# Patient Record
Sex: FEMALE | Race: ASIAN | NOT HISPANIC OR LATINO | URBAN - METROPOLITAN AREA
[De-identification: names, ages, dates, MRNs, and addresses within clinical notes are randomized per-mention and may not be internally consistent; named-entity substitution may affect disease eponyms.]

---

## 2019-02-23 ENCOUNTER — EMERGENCY (EMERGENCY)
Facility: HOSPITAL | Age: 34
LOS: 1 days | Discharge: HOME | End: 2019-02-23
Attending: EMERGENCY MEDICINE
Payer: COMMERCIAL

## 2019-02-23 VITALS
OXYGEN SATURATION: 100 % | TEMPERATURE: 98 F | RESPIRATION RATE: 16 BRPM | DIASTOLIC BLOOD PRESSURE: 75 MMHG | WEIGHT: 95.02 LBS | SYSTOLIC BLOOD PRESSURE: 112 MMHG | HEART RATE: 116 BPM | HEIGHT: 59 IN

## 2019-02-23 PROCEDURE — 99284 EMERGENCY DEPT VISIT MOD MDM: CPT | Mod: 25

## 2019-02-23 PROCEDURE — 93010 ELECTROCARDIOGRAM REPORT: CPT

## 2019-02-23 RX ORDER — DEXAMETHASONE 0.5 MG/5ML
10 ELIXIR ORAL ONCE
Qty: 0 | Refills: 0 | Status: COMPLETED | OUTPATIENT
Start: 2019-02-23 | End: 2019-02-23

## 2019-02-23 RX ORDER — SODIUM CHLORIDE 9 MG/ML
2000 INJECTION INTRAMUSCULAR; INTRAVENOUS; SUBCUTANEOUS ONCE
Qty: 0 | Refills: 0 | Status: COMPLETED | OUTPATIENT
Start: 2019-02-23 | End: 2019-02-23

## 2019-02-23 RX ORDER — ACETAMINOPHEN 500 MG
650 TABLET ORAL ONCE
Qty: 0 | Refills: 0 | Status: COMPLETED | OUTPATIENT
Start: 2019-02-23 | End: 2019-02-23

## 2019-02-23 RX ORDER — KETOROLAC TROMETHAMINE 30 MG/ML
15 SYRINGE (ML) INJECTION ONCE
Qty: 0 | Refills: 0 | Status: DISCONTINUED | OUTPATIENT
Start: 2019-02-23 | End: 2019-02-23

## 2019-02-23 RX ORDER — ONDANSETRON 8 MG/1
4 TABLET, FILM COATED ORAL ONCE
Qty: 0 | Refills: 0 | Status: COMPLETED | OUTPATIENT
Start: 2019-02-23 | End: 2019-02-23

## 2019-02-23 RX ADMIN — ONDANSETRON 4 MILLIGRAM(S): 8 TABLET, FILM COATED ORAL at 23:40

## 2019-02-23 RX ADMIN — Medication 10 MILLIGRAM(S): at 23:48

## 2019-02-23 RX ADMIN — Medication 15 MILLIGRAM(S): at 23:40

## 2019-02-23 RX ADMIN — Medication 650 MILLIGRAM(S): at 23:40

## 2019-02-23 RX ADMIN — SODIUM CHLORIDE 2000 MILLILITER(S): 9 INJECTION INTRAMUSCULAR; INTRAVENOUS; SUBCUTANEOUS at 23:40

## 2019-02-23 NOTE — ED ADULT TRIAGE NOTE - CHIEF COMPLAINT QUOTE
She has cough, chest congestion, fever, difficulty breathing, vomiting, headache x few days, not relieved by prescribed medications

## 2019-02-24 VITALS
SYSTOLIC BLOOD PRESSURE: 90 MMHG | TEMPERATURE: 99 F | DIASTOLIC BLOOD PRESSURE: 46 MMHG | HEART RATE: 91 BPM | RESPIRATION RATE: 16 BRPM | OXYGEN SATURATION: 99 %

## 2019-02-24 LAB
ALBUMIN SERPL ELPH-MCNC: 3.1 G/DL — LOW (ref 3.5–5)
ALP SERPL-CCNC: 65 U/L — SIGNIFICANT CHANGE UP (ref 40–120)
ALT FLD-CCNC: 15 U/L DA — SIGNIFICANT CHANGE UP (ref 10–60)
ANION GAP SERPL CALC-SCNC: 9 MMOL/L — SIGNIFICANT CHANGE UP (ref 5–17)
AST SERPL-CCNC: 18 U/L — SIGNIFICANT CHANGE UP (ref 10–40)
BASOPHILS # BLD AUTO: 0.02 K/UL — SIGNIFICANT CHANGE UP (ref 0–0.2)
BASOPHILS NFR BLD AUTO: 0.1 % — SIGNIFICANT CHANGE UP (ref 0–2)
BILIRUB SERPL-MCNC: 0.2 MG/DL — SIGNIFICANT CHANGE UP (ref 0.2–1.2)
BUN SERPL-MCNC: 11 MG/DL — SIGNIFICANT CHANGE UP (ref 7–18)
CALCIUM SERPL-MCNC: 7.7 MG/DL — LOW (ref 8.4–10.5)
CHLORIDE SERPL-SCNC: 103 MMOL/L — SIGNIFICANT CHANGE UP (ref 96–108)
CO2 SERPL-SCNC: 27 MMOL/L — SIGNIFICANT CHANGE UP (ref 22–31)
CREAT SERPL-MCNC: 0.86 MG/DL — SIGNIFICANT CHANGE UP (ref 0.5–1.3)
EOSINOPHIL # BLD AUTO: 0 K/UL — SIGNIFICANT CHANGE UP (ref 0–0.5)
EOSINOPHIL NFR BLD AUTO: 0 % — SIGNIFICANT CHANGE UP (ref 0–6)
FLU A RESULT: DETECTED
FLU A RESULT: DETECTED
FLUAV AG NPH QL: DETECTED
FLUBV AG NPH QL: SIGNIFICANT CHANGE UP
GLUCOSE SERPL-MCNC: 86 MG/DL — SIGNIFICANT CHANGE UP (ref 70–99)
HCG SERPL-ACNC: <1 MIU/ML — SIGNIFICANT CHANGE UP
HCT VFR BLD CALC: 34.2 % — LOW (ref 34.5–45)
HGB BLD-MCNC: 10.3 G/DL — LOW (ref 11.5–15.5)
IMM GRANULOCYTES NFR BLD AUTO: 0.4 % — SIGNIFICANT CHANGE UP (ref 0–1.5)
LACTATE SERPL-SCNC: 1.6 MMOL/L — SIGNIFICANT CHANGE UP (ref 0.7–2)
LYMPHOCYTES # BLD AUTO: 1.97 K/UL — SIGNIFICANT CHANGE UP (ref 1–3.3)
LYMPHOCYTES # BLD AUTO: 12.1 % — LOW (ref 13–44)
MCHC RBC-ENTMCNC: 22.6 PG — LOW (ref 27–34)
MCHC RBC-ENTMCNC: 30.1 GM/DL — LOW (ref 32–36)
MCV RBC AUTO: 75.2 FL — LOW (ref 80–100)
MONOCYTES # BLD AUTO: 0.65 K/UL — SIGNIFICANT CHANGE UP (ref 0–0.9)
MONOCYTES NFR BLD AUTO: 4 % — SIGNIFICANT CHANGE UP (ref 2–14)
NEUTROPHILS # BLD AUTO: 13.62 K/UL — HIGH (ref 1.8–7.4)
NEUTROPHILS NFR BLD AUTO: 83.4 % — HIGH (ref 43–77)
NRBC # BLD: 0 /100 WBCS — SIGNIFICANT CHANGE UP (ref 0–0)
PLATELET # BLD AUTO: 506 K/UL — HIGH (ref 150–400)
POTASSIUM SERPL-MCNC: 3.1 MMOL/L — LOW (ref 3.5–5.3)
POTASSIUM SERPL-SCNC: 3.1 MMOL/L — LOW (ref 3.5–5.3)
PROT SERPL-MCNC: 7.5 G/DL — SIGNIFICANT CHANGE UP (ref 6–8.3)
RBC # BLD: 4.55 M/UL — SIGNIFICANT CHANGE UP (ref 3.8–5.2)
RBC # FLD: 16.6 % — HIGH (ref 10.3–14.5)
RSV RESULT: SIGNIFICANT CHANGE UP
RSV RNA RESP QL NAA+PROBE: SIGNIFICANT CHANGE UP
SODIUM SERPL-SCNC: 139 MMOL/L — SIGNIFICANT CHANGE UP (ref 135–145)
WBC # BLD: 16.33 K/UL — HIGH (ref 3.8–10.5)
WBC # FLD AUTO: 16.33 K/UL — HIGH (ref 3.8–10.5)

## 2019-02-24 PROCEDURE — 96375 TX/PRO/DX INJ NEW DRUG ADDON: CPT

## 2019-02-24 PROCEDURE — 84702 CHORIONIC GONADOTROPIN TEST: CPT

## 2019-02-24 PROCEDURE — 80053 COMPREHEN METABOLIC PANEL: CPT

## 2019-02-24 PROCEDURE — 85027 COMPLETE CBC AUTOMATED: CPT

## 2019-02-24 PROCEDURE — 93005 ELECTROCARDIOGRAM TRACING: CPT

## 2019-02-24 PROCEDURE — 83605 ASSAY OF LACTIC ACID: CPT

## 2019-02-24 PROCEDURE — 96374 THER/PROPH/DIAG INJ IV PUSH: CPT

## 2019-02-24 PROCEDURE — 36415 COLL VENOUS BLD VENIPUNCTURE: CPT

## 2019-02-24 PROCEDURE — 87631 RESP VIRUS 3-5 TARGETS: CPT

## 2019-02-24 PROCEDURE — 99284 EMERGENCY DEPT VISIT MOD MDM: CPT | Mod: 25

## 2019-02-24 RX ORDER — SODIUM CHLORIDE 9 MG/ML
1000 INJECTION INTRAMUSCULAR; INTRAVENOUS; SUBCUTANEOUS ONCE
Qty: 0 | Refills: 0 | Status: COMPLETED | OUTPATIENT
Start: 2019-02-24 | End: 2019-02-24

## 2019-02-24 RX ADMIN — SODIUM CHLORIDE 4000 MILLILITER(S): 9 INJECTION INTRAMUSCULAR; INTRAVENOUS; SUBCUTANEOUS at 02:32

## 2019-02-24 RX ADMIN — Medication 15 MILLIGRAM(S): at 00:31

## 2019-02-24 RX ADMIN — Medication 650 MILLIGRAM(S): at 00:31

## 2019-02-24 NOTE — ED PROVIDER NOTE - OBJECTIVE STATEMENT
34 yo F no pmh presents with non productive cough x 1 week. Associated with fever, myalgias, and malaise. Took z pack and prednisone with minimal relief. Denies other acute complaints.

## 2019-02-24 NOTE — ED PROVIDER NOTE - PHYSICAL EXAMINATION
GENERAL: wells appearing, no acute distress, coughing during exam   HEAD: atraumatic   EYES: EOMI, pink conjunctiva   ENT: moist oral mucosa   CARDIAC: tachycardic, no edema, distal pulses present   RESPIRATORY: lungs CTAB, no increased work of breathing   GASTROINTESTINAL: no abdominal tenderness, no rebound or guarding, bowel sounds presents  GENITOURINARY: no CVA tenderness   MUSCULOSKELETAL: no deformity   NEUROLOGICAL: AAOx3, CN's II-XII intact, strength 5/5 bilateral UE and LE, sensation intact to light touch, steady gait   SKIN: intact   PSYCHIATRIC: cooperative  HEME LYMPH: no lymphadenopathy

## 2019-02-24 NOTE — ED PROVIDER NOTE - NS ED ROS FT
CONSTITUTIONAL: +fever, no chills, +malaise   EYES: no visual changes, no eye pain   ENMT: no nasal congestion, no throat pain  CARDIOVASCULAR: no chest pain, no edema, no palpitations   RESPIRATORY: no shortness of breath, +cough   GASTROINTESTINAL: no abdominal pain, no nausea, no vomiting, no diarrhea, no constipation   GENITOURINARY: no dysuria, no frequency  MUSCULOSKELETAL: no joint pains, no myalgias, no back pain   SKIN: no rashes  NEUROLOGICAL: no weakness, no headache, no dizziness, no slurred speech, no syncope   PSYCHIATRIC: no known mental health illness   HEME/LYMPH: no lymphadenopathy      All other ROS negative except as per HPI

## 2019-02-24 NOTE — ED PROVIDER NOTE - PROGRESS NOTE DETAILS
labs - leukocytosis, anemia, K 3.1, flu+  CXR - no pneumonia   EKG - nsr, rate 108, , QRS 72, QTc 423, twi II and III labs - leukocytosis, anemia, K 3.1, flu+  CXR - no pneumonia   EKG - nsr, rate 108, , QRS 72, QTc 423, twi II and III  Pt's BP borderline, however very thin body habitus so may be normal for pt. Not tachycardic. Feeling better and ambulatory around ED. Will d/c with continued symptomatic support and PCP fu. Discussed indications for patient return to ED. Patient understood.

## 2022-04-13 ENCOUNTER — INPATIENT (INPATIENT)
Facility: HOSPITAL | Age: 37
LOS: 1 days | Discharge: HOME | DRG: 392 | End: 2022-04-15
Attending: HOSPITALIST | Admitting: HOSPITALIST
Payer: COMMERCIAL

## 2022-04-13 VITALS
RESPIRATION RATE: 18 BRPM | TEMPERATURE: 98 F | DIASTOLIC BLOOD PRESSURE: 69 MMHG | OXYGEN SATURATION: 98 % | HEART RATE: 81 BPM | HEIGHT: 59 IN | WEIGHT: 89.95 LBS | SYSTOLIC BLOOD PRESSURE: 108 MMHG

## 2022-04-13 DIAGNOSIS — D75.839 THROMBOCYTOSIS, UNSPECIFIED: ICD-10-CM

## 2022-04-13 DIAGNOSIS — Z29.9 ENCOUNTER FOR PROPHYLACTIC MEASURES, UNSPECIFIED: ICD-10-CM

## 2022-04-13 DIAGNOSIS — K52.9 NONINFECTIVE GASTROENTERITIS AND COLITIS, UNSPECIFIED: ICD-10-CM

## 2022-04-13 DIAGNOSIS — N83.209 UNSPECIFIED OVARIAN CYST, UNSPECIFIED SIDE: ICD-10-CM

## 2022-04-13 DIAGNOSIS — D64.9 ANEMIA, UNSPECIFIED: ICD-10-CM

## 2022-04-13 DIAGNOSIS — R19.7 DIARRHEA, UNSPECIFIED: ICD-10-CM

## 2022-04-13 LAB
ALBUMIN SERPL ELPH-MCNC: 2.6 G/DL — LOW (ref 3.5–5)
ALP SERPL-CCNC: 68 U/L — SIGNIFICANT CHANGE UP (ref 40–120)
ALT FLD-CCNC: 10 U/L DA — SIGNIFICANT CHANGE UP (ref 10–60)
ANION GAP SERPL CALC-SCNC: 7 MMOL/L — SIGNIFICANT CHANGE UP (ref 5–17)
APPEARANCE UR: CLEAR — SIGNIFICANT CHANGE UP
AST SERPL-CCNC: 8 U/L — LOW (ref 10–40)
BASOPHILS # BLD AUTO: 0 K/UL — SIGNIFICANT CHANGE UP (ref 0–0.2)
BASOPHILS NFR BLD AUTO: 0 % — SIGNIFICANT CHANGE UP (ref 0–2)
BILIRUB SERPL-MCNC: 0.3 MG/DL — SIGNIFICANT CHANGE UP (ref 0.2–1.2)
BILIRUB UR-MCNC: NEGATIVE — SIGNIFICANT CHANGE UP
BUN SERPL-MCNC: 8 MG/DL — SIGNIFICANT CHANGE UP (ref 7–18)
CALCIUM SERPL-MCNC: 8.7 MG/DL — SIGNIFICANT CHANGE UP (ref 8.4–10.5)
CHLORIDE SERPL-SCNC: 106 MMOL/L — SIGNIFICANT CHANGE UP (ref 96–108)
CO2 SERPL-SCNC: 25 MMOL/L — SIGNIFICANT CHANGE UP (ref 22–31)
COLOR SPEC: YELLOW — SIGNIFICANT CHANGE UP
CREAT SERPL-MCNC: 0.53 MG/DL — SIGNIFICANT CHANGE UP (ref 0.5–1.3)
DIFF PNL FLD: NEGATIVE — SIGNIFICANT CHANGE UP
EGFR: 123 ML/MIN/1.73M2 — SIGNIFICANT CHANGE UP
EOSINOPHIL # BLD AUTO: 0 K/UL — SIGNIFICANT CHANGE UP (ref 0–0.5)
EOSINOPHIL NFR BLD AUTO: 0 % — SIGNIFICANT CHANGE UP (ref 0–6)
GLUCOSE SERPL-MCNC: 102 MG/DL — HIGH (ref 70–99)
GLUCOSE UR QL: NEGATIVE — SIGNIFICANT CHANGE UP
HCG SERPL-ACNC: <1 MIU/ML — SIGNIFICANT CHANGE UP
HCT VFR BLD CALC: 29.4 % — LOW (ref 34.5–45)
HGB BLD-MCNC: 8.7 G/DL — LOW (ref 11.5–15.5)
IRON SATN MFR SERPL: 22 UG/DL — LOW (ref 40–150)
IRON SATN MFR SERPL: 8 % — LOW (ref 15–50)
KETONES UR-MCNC: ABNORMAL
LEUKOCYTE ESTERASE UR-ACNC: NEGATIVE — SIGNIFICANT CHANGE UP
LIDOCAIN IGE QN: 76 U/L — SIGNIFICANT CHANGE UP (ref 73–393)
LYMPHOCYTES # BLD AUTO: 0.57 K/UL — LOW (ref 1–3.3)
LYMPHOCYTES # BLD AUTO: 4 % — LOW (ref 13–44)
MCHC RBC-ENTMCNC: 19.8 PG — LOW (ref 27–34)
MCHC RBC-ENTMCNC: 29.6 GM/DL — LOW (ref 32–36)
MCV RBC AUTO: 67 FL — LOW (ref 80–100)
MONOCYTES # BLD AUTO: 0 K/UL — SIGNIFICANT CHANGE UP (ref 0–0.9)
MONOCYTES NFR BLD AUTO: 0 % — LOW (ref 2–14)
NEUTROPHILS # BLD AUTO: 13.61 K/UL — HIGH (ref 1.8–7.4)
NEUTROPHILS NFR BLD AUTO: 96 % — HIGH (ref 43–77)
NITRITE UR-MCNC: NEGATIVE — SIGNIFICANT CHANGE UP
PH UR: 7 — SIGNIFICANT CHANGE UP (ref 5–8)
PLATELET # BLD AUTO: 678 K/UL — HIGH (ref 150–400)
POTASSIUM SERPL-MCNC: 4.2 MMOL/L — SIGNIFICANT CHANGE UP (ref 3.5–5.3)
POTASSIUM SERPL-SCNC: 4.2 MMOL/L — SIGNIFICANT CHANGE UP (ref 3.5–5.3)
PROT SERPL-MCNC: 6.8 G/DL — SIGNIFICANT CHANGE UP (ref 6–8.3)
PROT UR-MCNC: NEGATIVE — SIGNIFICANT CHANGE UP
RBC # BLD: 4.39 M/UL — SIGNIFICANT CHANGE UP (ref 3.8–5.2)
RBC # FLD: 18.4 % — HIGH (ref 10.3–14.5)
SARS-COV-2 RNA SPEC QL NAA+PROBE: SIGNIFICANT CHANGE UP
SODIUM SERPL-SCNC: 138 MMOL/L — SIGNIFICANT CHANGE UP (ref 135–145)
SP GR SPEC: 1.01 — SIGNIFICANT CHANGE UP (ref 1.01–1.02)
TIBC SERPL-MCNC: 282 UG/DL — SIGNIFICANT CHANGE UP (ref 250–450)
UIBC SERPL-MCNC: 260 UG/DL — SIGNIFICANT CHANGE UP (ref 110–370)
UROBILINOGEN FLD QL: NEGATIVE — SIGNIFICANT CHANGE UP
WBC # BLD: 14.18 K/UL — HIGH (ref 3.8–10.5)
WBC # FLD AUTO: 14.18 K/UL — HIGH (ref 3.8–10.5)

## 2022-04-13 PROCEDURE — 99223 1ST HOSP IP/OBS HIGH 75: CPT

## 2022-04-13 PROCEDURE — 76830 TRANSVAGINAL US NON-OB: CPT | Mod: 26

## 2022-04-13 PROCEDURE — 99285 EMERGENCY DEPT VISIT HI MDM: CPT

## 2022-04-13 PROCEDURE — 74177 CT ABD & PELVIS W/CONTRAST: CPT | Mod: 26,MA

## 2022-04-13 PROCEDURE — 76856 US EXAM PELVIC COMPLETE: CPT | Mod: 26

## 2022-04-13 PROCEDURE — 99223 1ST HOSP IP/OBS HIGH 75: CPT | Mod: GC

## 2022-04-13 RX ORDER — CEFOTETAN DISODIUM 1 G
1 VIAL (EA) INJECTION ONCE
Refills: 0 | Status: COMPLETED | OUTPATIENT
Start: 2022-04-13 | End: 2022-04-13

## 2022-04-13 RX ORDER — SODIUM CHLORIDE 9 MG/ML
1000 INJECTION INTRAMUSCULAR; INTRAVENOUS; SUBCUTANEOUS ONCE
Refills: 0 | Status: COMPLETED | OUTPATIENT
Start: 2022-04-13 | End: 2022-04-13

## 2022-04-13 RX ORDER — MORPHINE SULFATE 50 MG/1
2 CAPSULE, EXTENDED RELEASE ORAL ONCE
Refills: 0 | Status: DISCONTINUED | OUTPATIENT
Start: 2022-04-13 | End: 2022-04-13

## 2022-04-13 RX ORDER — ONDANSETRON 8 MG/1
4 TABLET, FILM COATED ORAL EVERY 8 HOURS
Refills: 0 | Status: DISCONTINUED | OUTPATIENT
Start: 2022-04-13 | End: 2022-04-15

## 2022-04-13 RX ORDER — SODIUM CHLORIDE 9 MG/ML
1000 INJECTION INTRAMUSCULAR; INTRAVENOUS; SUBCUTANEOUS
Refills: 0 | Status: DISCONTINUED | OUTPATIENT
Start: 2022-04-13 | End: 2022-04-15

## 2022-04-13 RX ORDER — ONDANSETRON 8 MG/1
4 TABLET, FILM COATED ORAL ONCE
Refills: 0 | Status: COMPLETED | OUTPATIENT
Start: 2022-04-13 | End: 2022-04-13

## 2022-04-13 RX ORDER — IOHEXOL 300 MG/ML
30 INJECTION, SOLUTION INTRAVENOUS ONCE
Refills: 0 | Status: COMPLETED | OUTPATIENT
Start: 2022-04-13 | End: 2022-04-13

## 2022-04-13 RX ORDER — SODIUM CHLORIDE 9 MG/ML
1000 INJECTION INTRAMUSCULAR; INTRAVENOUS; SUBCUTANEOUS
Refills: 0 | Status: DISCONTINUED | OUTPATIENT
Start: 2022-04-13 | End: 2022-04-13

## 2022-04-13 RX ADMIN — MORPHINE SULFATE 2 MILLIGRAM(S): 50 CAPSULE, EXTENDED RELEASE ORAL at 07:00

## 2022-04-13 RX ADMIN — SODIUM CHLORIDE 1000 MILLILITER(S): 9 INJECTION INTRAMUSCULAR; INTRAVENOUS; SUBCUTANEOUS at 12:49

## 2022-04-13 RX ADMIN — Medication 100 GRAM(S): at 08:04

## 2022-04-13 RX ADMIN — SODIUM CHLORIDE 100 MILLILITER(S): 9 INJECTION INTRAMUSCULAR; INTRAVENOUS; SUBCUTANEOUS at 23:04

## 2022-04-13 RX ADMIN — MORPHINE SULFATE 2 MILLIGRAM(S): 50 CAPSULE, EXTENDED RELEASE ORAL at 06:57

## 2022-04-13 RX ADMIN — SODIUM CHLORIDE 1000 MILLILITER(S): 9 INJECTION INTRAMUSCULAR; INTRAVENOUS; SUBCUTANEOUS at 14:52

## 2022-04-13 RX ADMIN — SODIUM CHLORIDE 1000 MILLILITER(S): 9 INJECTION INTRAMUSCULAR; INTRAVENOUS; SUBCUTANEOUS at 07:00

## 2022-04-13 RX ADMIN — ONDANSETRON 4 MILLIGRAM(S): 8 TABLET, FILM COATED ORAL at 06:57

## 2022-04-13 RX ADMIN — IOHEXOL 30 MILLILITER(S): 300 INJECTION, SOLUTION INTRAVENOUS at 06:57

## 2022-04-13 RX ADMIN — SODIUM CHLORIDE 150 MILLILITER(S): 9 INJECTION INTRAMUSCULAR; INTRAVENOUS; SUBCUTANEOUS at 06:57

## 2022-04-13 NOTE — H&P ADULT - NSHPPHYSICALEXAM_GEN_ALL_CORE
PHYSICAL EXAM:  GENERAL: NAD, lying in bed comfortably, pale  HEAD:  Atraumatic, Normocephalic  EYES: EOMI, PERRLA, conjunctiva and sclera clear  ENT: Moist mucous membranes  NECK: Supple, No JVD  CHEST/LUNG: Clear to auscultation bilaterally; No rales, rhonchi, wheezing, or rubs. Unlabored respirations  HEART: Regular rate and rhythm; No murmurs, rubs, or gallops  ABDOMEN: Bowel sounds present; Soft, Nontender, Nondistended. No hepatomegally  EXTREMITIES:  2+ Peripheral Pulses, brisk capillary refill. No clubbing, cyanosis, or edema  NERVOUS SYSTEM:  Alert & Oriented X3, speech clear. No deficits   MSK: FROM all 4 extremities, full and equal strength  SKIN: No rashes or lesions

## 2022-04-13 NOTE — H&P ADULT - ASSESSMENT
CT abdomen/pelvis w IV and PO contrast: Distal small bowel severe enteritis with upstream small bowel distention associated low-grade/partial bowel obstruction versus ileus. Infectious and inflammatory etiologies are the primary considerations, including inflammatory bowel disease. Short term follow up imaging is recommended   to ensure resolution.    Bilateral ovarian low attenuation lesions, measuring 3.3 cm on the right and left, with closed proximity the midline, image 107 series 2. Correlate with pelvic ultrasound or MRI for characterization. Differential includes ovarian cysts, endometriomas, and tuboovarian abscesses/PID.    Appendix is not obstructed, non-distended.    US pelvis/TVUS: Complex cysts are identified within the bilateral ovarian parenchyma, as described above. Follow-up endovaginal pelvic ultrasonography in 2-3 months suggested for reevaluation. Free pelvic fluid.    WBC 14k w/ left shift  Hb 8.7 MCV 67.0 with elevated RDW   Plt 678 35 y/o F with PMH of CITLALLI and Hemorrhoids admitted for enteritis.     CT abdomen/pelvis w IV and PO contrast: Distal small bowel severe enteritis with upstream small bowel distention associated low-grade/partial bowel obstruction versus ileus. Infectious and inflammatory etiologies are the primary considerations, including inflammatory bowel disease. Short term follow up imaging is recommended   to ensure resolution.    Bilateral ovarian low attenuation lesions, measuring 3.3 cm on the right and left, with closed proximity the midline, image 107 series 2. Correlate with pelvic ultrasound or MRI for characterization. Differential includes ovarian cysts, endometriomas, and tuboovarian abscesses/PID.    Appendix is not obstructed, non-distended.    US pelvis/TVUS: Complex cysts are identified within the bilateral ovarian parenchyma, as described above. Follow-up endovaginal pelvic ultrasonography in 2-3 months suggested for reevaluation. Free pelvic fluid.    WBC 14k w/ left shift  Hb 8.7 MCV 67.0 with elevated RDW   Plt 678 37 y/o F with PMH of CITLALLI and Hemorrhoids admitted for diarrheal workup.

## 2022-04-13 NOTE — ED PROVIDER NOTE - OBJECTIVE STATEMENT
Thai translation by  Sayed.  hief complaint of abdominal pain, nausea and vomiting since last night. No reported fever, no shortness of breath.  No urinary symptoms, no vaginal bleeding.  Pt with knowni berhane deficiency anemia. Kyrgyz translation by  Sayed.  hief complaint of abdominal pain, nausea and vomiting since last night. No reported fever, no shortness of breath.  No urinary symptoms, no vaginal bleeding.  Pt with known iron deficiency anemia.

## 2022-04-13 NOTE — H&P ADULT - PROBLEM SELECTOR PLAN 4
patient with incidental findings of ovarian cyst on CT  TVUS/ US pelvis with Complex cysts are identified within the bilateral ovarian parenchyma, as described above. Follow-up endovaginal pelvic ultrasonography in 2-3 months suggested for reevaluation. Free pelvic fluid.    - obtain urine gonorrhea/chlamydia to rule out PID  - follow up TVUS as outpatient in 2-3 months

## 2022-04-13 NOTE — CONSULT NOTE ADULT - ASSESSMENT
37 y/o Female w/ Enteritis      -No acute surgical intervention indicated at present time   -PSBO unlikely, no evidence of acute abdomen on PE  -Medical admission   -GI eval   -Pain medication PRN   -Abx   -D/w Dr Guillen and agrees

## 2022-04-13 NOTE — H&P ADULT - HISTORY OF PRESENT ILLNESS
36 year old F with PMH of CITLALLI and no PSH presenting for abdominal pain with nausea and vomiting    ED Course  Vitals:  Meds: zofran 4 mg IV, cefotetan 1 g IV, morphine 2 mg IV, NS 3 L bolus, NS @ 150 cc/hr   36 year old F with PMH of CITLALLI and no PSH presenting for abdominal pain with nausea and vomiting    ED Course  Vitals: /69 P 81 R 18 T 98F SpO2 98% RA  Meds: zofran 4 mg IV, cefotetan 1 g IV, morphine 2 mg IV, NS 3 L bolus, NS @ 150 cc/hr   36 year old F with PMH of CITLALLI, hemorrhoids and no PSH presenting for epigastric pain and diarrhea. Patient reports 7-9/10 epigastric pain radiating to substernal region since last night 9pm associated with nausea and 3 episodes mucousy non-bloody diarrhea. States that she tried simethicone with no relief. Reports one episode of vomiting clear liquid this morning after drinking oral contrast for her CT scan, but at time of interview denies any epigastric pain, nausea, vomiting, diarrhea, fever, chills. Also denies chest pain, shortness of breath, palpitations, urinary changes, heavy menses. Reports starting iron pills in the past but states they made her constipated and her hemorrhoids got worse so she stopped but was restarted on iron pills 4 days ago as her hemoglobin was 9.0 when she saw her PCP.  at bedside states she appears slightly more pale in the past 3 weeks than normal. Denies eating outside food, denies sick contacts, moved to [place] from [place] in 2009 and most recently visited in 2019.     ED Course  Vitals: /69 P 81 R 18 T 98F SpO2 98% RA  Meds: zofran 4 mg IV, cefotetan 1 g IV, morphine 2 mg IV, NS 3 L bolus, NS @ 150 cc/hr

## 2022-04-13 NOTE — H&P ADULT - PROBLEM SELECTOR PLAN 2
patient w h/o CITLALLI on oral iron however causes constipation and worsening hemorrhoids. patient reports normal menses without heavy flow  HB at baseline 9.0 3 weeks ago, now 8.6 with MCV 70  also with thrombocytosis    - hemoglobin electrophoresis  - iron studies pending  - consult QMA in AM for possible venofer administration

## 2022-04-13 NOTE — CONSULT NOTE ADULT - SUBJECTIVE AND OBJECTIVE BOX
35 y/o Female w/ PHMx Iron Def Anemia, no PSHx presened to ED w/ c/o epigastric pain, pain present since last night, a/w nausea and vomiting x1, bm last night, no blood seen; Denies fever, chills, no SOB or CP. No recent travels, no outside food consumption.   CT abd/pelvis done is ED and showed:  < from: CT Abdomen and Pelvis w/ Oral Cont and w/ IV Cont (04.13.22 @ 10:32) >    FINDINGS:    LOWER CHEST: No visualized pleural effusion    LIVER: Normal size. Main portal vein and hepatic veins are patent  BILE DUCTS: No biliary distention  GALLBLADDER: Unremarkable CT appearance  SPLEEN: Normal size  PANCREAS: No main ductal dilatation  ADRENALS: Unremarkable  KIDNEYS/URETERS: No hydronephrosis    BLADDER: Underdistended  REPRODUCTIVE ORGANS: Bilateral ovarian low attenuation lesions, measuring   3.3 cm on the right and left, with closed proximity the midline, image   107 series 2. Correlate with pelvic ultrasound or MRI for   characterization. Differential includes ovarian cysts, endometriomas, and   tuboovarian abscesses/PID.    BOWEL: Stomach and proximal small bowel mildly distended with oral   contrast.The mid small bowel is underdistended and unopacified with oral   contrast. Distal small bowel is distended, with long segment mural   thickening and hyperemia extending up to the terminal ileum.   Hyperenhancing bowel mucosa and surrounding vasa recta also noted along   the distal small bowel. Findings are concerning for distal small bowel   severe enteritis with associated low-grade/partial bowel obstruction   versus ileus. Infectious and inflammatory etiologies are the primary   considerations, including inflammatory bowel disease. The appendix is not   obstructed, nondistended. Large bowel is mostly fluid-filled and   underdistended. Tiny periampullary duodenal diverticulum.  PERITONEUM: Small ascites. No loculated fluid collection or free air.  VESSELS: No aneurysm of the abdominal aorta. Central vein patency is not   assessed due to timing of contrast.  RETROPERITONEUM/LYMPH NODES: Small volume nodes of the mesentery up to 7   mm in short axis.  ABDOMINAL WALL: Tiny fat-containing umbilical hernia.  BONES: No aggressive osseous lesion.    IMPRESSION:    Distal small bowel severe enteritis with upstream small bowel distention   associated low-grade/partial bowel obstruction versus ileus. Infectious   and inflammatory etiologies are the primary considerations, including   inflammatory bowel disease. Short term follow up imaging is recommended   to ensure resolution.    Bilateral ovarian low attenuation lesions, measuring 3.3 cm on the right   and left, with closed proximity the midline, image 107 series 2.   Correlate with pelvic ultrasound or MRI for characterization.   Differential includes ovarian cysts, endometriomas, and tuboovarian   abscesses/PID.    Appendix is not obstructed, non-distended.    Findings discussed with Dr. Carrillo from ER on 4/13/2022 at 11:30 AM.    --- End of Report ---    < end of copied text >

## 2022-04-13 NOTE — ED PROVIDER NOTE - PROGRESS NOTE DETAILS
Carrillo:  Pt received in signout from Dr. Gibson to f/u CT A/P--CT showed enteritis, possible early SBO, and b/l ovarian abnormalities.   General Surgery evaluated and they do not feel there is SBO or any surgical issue.  Pelvic US done and shows normal ovarian flow with ovarian cysts.    Dr. Donohue informed for admission.

## 2022-04-13 NOTE — H&P ADULT - NSHPSOCIALHISTORY_GEN_ALL_CORE
lives at home with , works as housewife, denies alcohol, tobacco or other drug use or history. Independent with ADLs

## 2022-04-13 NOTE — ED ADULT TRIAGE NOTE - CHIEF COMPLAINT QUOTE
Presents to ED for epigastric pain, nausea, vomiting x3 since 9 pm last night. Last PO 7 pm dinner. Has tried taking simethecone to no relief.

## 2022-04-13 NOTE — H&P ADULT - PROBLEM SELECTOR PLAN 1
Patient p/w nonbloody diarrhea, also with leukocytosis and CT findings of distal small bowel enteritis with upstream small bowel distension Patient p/w nonbloody diarrhea, also with leukocytosis and CT findings of distal small bowel enteritis with upstream small bowel distension  seen by surgery, no indication for acute intervention  no further episodes of diarrhea, abdominal pain resolved  given thrombocytosis and patient [place] in [facility], will attempt to rule out parasitic infections causing diarrhea    - GI PCR, stool O&P  - C. diff  - advance diet as tolerated Patient p/w nonbloody diarrhea, also with leukocytosis and CT findings of distal small bowel enteritis with upstream small bowel distension  seen by surgery, no indication for acute intervention  no further episodes of diarrhea, abdominal pain resolved  given thrombocytosis and patient h/o living in village in [facility], will attempt to rule out parasitic infections causing diarrhea    - GI PCR, stool O&P  - C. diff PCR  - advance diet as tolerated

## 2022-04-13 NOTE — H&P ADULT - ATTENDING COMMENTS
36 year old F with PMH of CITLALLI, hemorrhoids and no PSH presenting for epigastric pain and diarrhea. Patient reports 7-9/10 epigastric pain radiating to substernal region since last night 9pm associated with nausea and several episodes non-bloody diarrhea with mucous.     Very pale, alert, cooperative woman in NAD  Vital Signs Last 24 Hrs  T(C): 36.7 (13 Apr 2022 20:45), Max: 36.8 (13 Apr 2022 11:08)  T(F): 98 (13 Apr 2022 20:45), Max: 98.2 (13 Apr 2022 11:08)  HR: 80 (13 Apr 2022 20:45) (74 - 82)  BP: 96/71 (13 Apr 2022 20:45) (88/51 - 108/69)  BP(mean): --  RR: 18 (13 Apr 2022 20:45) (16 - 18)  SpO2: 100% (13 Apr 2022 20:45) (98% - 100%)  Lungs, clear  Cor, RRR  abdomen, soft  Neurological, intact                        8.7    14.18 )-----------( 678      ( 13 Apr 2022 05:41 )             29.4   04-13    138  |  106  |  8   ----------------------------<  102<H>  4.2   |  25  |  0.53    Ca    8.7      13 Apr 2022 05:41    TPro  6.8  /  Alb  2.6<L>  /  TBili  0.3  /  DBili  x   /  AST  8<L>  /  ALT  10  /  AlkPhos  68  04-13    < from: CT Abdomen and Pelvis w/ Oral Cont and w/ IV Cont (04.13.22 @ 10:32) >    IMPRESSION:    Distal small bowel severe enteritis with upstream small bowel distention   associated low-grade/partial bowel obstruction versus ileus. Infectious   and inflammatory etiologies are the primary considerations, including   inflammatory bowel disease. Short term follow up imaging is recommended   to ensure resolution.    Bilateral ovarian low attenuation lesions, measuring 3.3 cm on the right   and left, with closed proximity the midline, image 107 series 2.   Correlate with pelvic ultrasound or MRI for characterization.   Differential includes ovarian cysts, endometriomas, and tuboovarian   abscesses/PID.    < end of copied text >    < from: US Pelvis Complete (US Pelvis Complete .) (04.13.22 @ 12:40) >    Complex cysts are identified within the bilateral ovarian parenchyma, as   described above. Follow-up endovaginal pelvic ultrasonography in 2-3   months suggested for reevaluation. Free pelvic fluid.    < end of copied text >    % Saturation, Iron: 8 % (04.13.22 @ 18:44)     IMP:  Abdominal pain, diarrhea, small bowel inflammation and iron deficiency anemia [place] [facility].  Ddx includes parasitic infestation, such as Ancyclostoma duodenale.  Bacterial enteritis is less likely.  Patient may also have auto-                immune enteritis.           Complex ovarian cysts are also present, as is free pelvic fluid.  PID with TOA is unlikely, but should be excluded.  Patient will need GYN f/u as               outpatient, after acute problem has been diagnosed and treated.   Plan:  Stool O & P,  Stool pathogen PCR.  C. diff PCR because of watery diarrhea.            QMA consultation tomorrow to approve IV iron administration (ferritin, result is pending), because patient has been unable to tolerate oral          iron.            Urine GC/CT           GYN evaluation as outpatient.

## 2022-04-14 LAB
ANION GAP SERPL CALC-SCNC: 3 MMOL/L — LOW (ref 5–17)
BLD GP AB SCN SERPL QL: SIGNIFICANT CHANGE UP
BUN SERPL-MCNC: 3 MG/DL — LOW (ref 7–18)
CALCIUM SERPL-MCNC: 7.7 MG/DL — LOW (ref 8.4–10.5)
CHLORIDE SERPL-SCNC: 114 MMOL/L — HIGH (ref 96–108)
CO2 SERPL-SCNC: 25 MMOL/L — SIGNIFICANT CHANGE UP (ref 22–31)
CREAT SERPL-MCNC: 0.51 MG/DL — SIGNIFICANT CHANGE UP (ref 0.5–1.3)
CULTURE RESULTS: SIGNIFICANT CHANGE UP
EGFR: 124 ML/MIN/1.73M2 — SIGNIFICANT CHANGE UP
FERRITIN SERPL-MCNC: 9 NG/ML — LOW (ref 15–150)
GLUCOSE SERPL-MCNC: 83 MG/DL — SIGNIFICANT CHANGE UP (ref 70–99)
HCT VFR BLD CALC: 24.8 % — LOW (ref 34.5–45)
HGB BLD-MCNC: 7.1 G/DL — LOW (ref 11.5–15.5)
MCHC RBC-ENTMCNC: 19.8 PG — LOW (ref 27–34)
MCHC RBC-ENTMCNC: 28.6 GM/DL — LOW (ref 32–36)
MCV RBC AUTO: 69.3 FL — LOW (ref 80–100)
N GONORRHOEA RRNA SPEC QL NAA+PROBE: SIGNIFICANT CHANGE UP
NRBC # BLD: 0 /100 WBCS — SIGNIFICANT CHANGE UP (ref 0–0)
PLATELET # BLD AUTO: 545 K/UL — HIGH (ref 150–400)
POTASSIUM SERPL-MCNC: 3.5 MMOL/L — SIGNIFICANT CHANGE UP (ref 3.5–5.3)
POTASSIUM SERPL-SCNC: 3.5 MMOL/L — SIGNIFICANT CHANGE UP (ref 3.5–5.3)
RBC # BLD: 3.58 M/UL — LOW (ref 3.8–5.2)
RBC # FLD: 18.6 % — HIGH (ref 10.3–14.5)
SODIUM SERPL-SCNC: 142 MMOL/L — SIGNIFICANT CHANGE UP (ref 135–145)
SPECIMEN SOURCE: SIGNIFICANT CHANGE UP
SPECIMEN SOURCE: SIGNIFICANT CHANGE UP
WBC # BLD: 6.11 K/UL — SIGNIFICANT CHANGE UP (ref 3.8–10.5)
WBC # FLD AUTO: 6.11 K/UL — SIGNIFICANT CHANGE UP (ref 3.8–10.5)

## 2022-04-14 PROCEDURE — 99232 SBSQ HOSP IP/OBS MODERATE 35: CPT

## 2022-04-14 PROCEDURE — 99233 SBSQ HOSP IP/OBS HIGH 50: CPT

## 2022-04-14 PROCEDURE — 83020 HEMOGLOBIN ELECTROPHORESIS: CPT | Mod: 26

## 2022-04-14 RX ORDER — IRON SUCROSE 20 MG/ML
200 INJECTION, SOLUTION INTRAVENOUS EVERY 24 HOURS
Refills: 0 | Status: DISCONTINUED | OUTPATIENT
Start: 2022-04-14 | End: 2022-04-15

## 2022-04-14 RX ADMIN — IRON SUCROSE 110 MILLIGRAM(S): 20 INJECTION, SOLUTION INTRAVENOUS at 14:16

## 2022-04-14 NOTE — PROGRESS NOTE ADULT - NS ATTEND AMEND GEN_ALL_CORE FT
Patient is no longer having diarrhea.   Need to collect stool O&P and GI PCR.   discontinue C. diff.   Start iron sucrose.   Hematology and ID consultations, appreciated.   Plan: If stable, will discharge tomorrow for outpatient w/u and treatment.

## 2022-04-14 NOTE — DISCHARGE NOTE PROVIDER - HOSPITAL COURSE
37 y/o F with PMH of CITLALLI and Hemorrhoids admitted for diarrhea workup. AO x 3 reports no BM since admission, blood work reviewed and updated to pt and spouse at the bedside, QMA consulted for CITLALLI.     Patient presented with diarrhea and abdominal pain. Surgery department was consulted for diarrhea and abdominal pain. There was no intervention to be done. The symptoms resolved. CT of the abdomen showed distal small bowel with severe enteritis with upstream small bowel distention associated low-grade/partial bowel obstruction versus ileus. Infectious and inflammatory etiologies are the primary considerations, including inflammatory bowel disease. Stool studies were __________. Patient tolerated regular diet well.    Patient had history of iron deficiency anemia and took oral iron but it caused constipation and worsening hemorrhoids. Patient reported normal menses without heavy flow. Baseline hemoglobin was around 9.0. Patient presented with hemoglobin of 8.6 with MCV 70. Iron panel showed iron deficiency. Patient presented with thrombocytosis of 506. Heme/oncology QMA was consulted. Patient was started on Venofer.    Patient was found to have ovarian cyst. CT of the abdomen and pelvis showed bilateral ovarian low attenuation lesions, measuring 3.3 cm on the right and left, with closed proximity the midline. Differentials includeed ovarian cysts, endometriomas, and tuboovarian  abscesses/PID. Transvaginal ultrasound of the pelvis showed some free pelvic fluid, a thick-walled right ovary cyst with septation and internal debris measuring 3.1 x 2.5 x 2.4 cm, thick-walled left cyst with septation and internal debris measuring 3.4x 2.7 x 2.7 cm. Follow-up endovaginal pelvic ultrasonography in 2-3 months suggested for reevaluation. Urine gonorrhea/chlamydia were negative.    Patient is stable for discharge. Patient has been advised to follow up as outpatient. Case has been discussed with the attending.     35 y/o F with PMH of CITLALLI and Hemorrhoids admitted for diarrhea workup. AO x 3 reports no BM since admission, blood work reviewed and updated to pt and spouse at the bedside, QMA consulted for CITLALLI.     Patient presented with diarrhea and abdominal pain. Surgery department was consulted for diarrhea and abdominal pain. There was no intervention to be done. The symptoms resolved. CT of the abdomen showed distal small bowel with severe enteritis with upstream small bowel distention associated low-grade/partial bowel obstruction versus ileus. Infectious and inflammatory etiologies are the primary considerations, including inflammatory bowel disease. Stool studies were sent. Patient tolerated regular diet well.    Patient had history of iron deficiency anemia and took oral iron but it caused constipation and worsening hemorrhoids. Patient reported normal menses without heavy flow. Baseline hemoglobin was around 9.0. Patient presented with hemoglobin of 8.6 with MCV 70. Iron panel showed iron deficiency. Patient presented with thrombocytosis of 506. Heme/oncology QMA was consulted. Patient was started on Venofer. Patient's Hgb was 6.9 and she received 1 U of pRBC transfusion.    Patient was found to have ovarian cyst. CT of the abdomen and pelvis showed bilateral ovarian low attenuation lesions, measuring 3.3 cm on the right and left, with closed proximity the midline. Differentials includeed ovarian cysts, endometriomas, and tuboovarian  abscesses/PID. Transvaginal ultrasound of the pelvis showed some free pelvic fluid, a thick-walled right ovary cyst with septation and internal debris measuring 3.1 x 2.5 x 2.4 cm, thick-walled left cyst with septation and internal debris measuring 3.4x 2.7 x 2.7 cm. Follow-up endovaginal pelvic ultrasonography in 2-3 months suggested for reevaluation. Urine gonorrhea/chlamydia were negative.    Patient is stable for discharge. Patient has been advised to follow up as outpatient. Case has been discussed with the attending.     37 y/o F with PMH of CITLALLI and Hemorrhoids admitted for diarrhea workup. AO x 3 reports no BM since admission, blood work reviewed and updated to pt and spouse at the bedside, QMA consulted for CITLALLI.     Patient presented with abdominal pain. Surgery department was consulted for diarrhea and abdominal pain. There was no intervention to be done. The symptoms resolved. CT of the abdomen showed distal small bowel with severe enteritis with upstream small bowel distention associated low-grade/partial bowel obstruction versus ileus. Infectious and inflammatory etiologies are the primary considerations, including inflammatory bowel disease. Stool studies were sent. Patient was found to have Blastocystis hominis cysts in stool. GI PCR was negative. Patient tolerated regular diet well.    Patient had history of iron deficiency anemia and took oral iron but it caused constipation and worsening hemorrhoids. Patient reported normal menses without heavy flow. Baseline hemoglobin was around 9.0. Patient presented with hemoglobin of 8.6 with MCV 70. Iron panel showed iron deficiency. Patient presented with thrombocytosis of 506. Heme/oncology QMA was consulted. Patient was started on Venofer. Patient's Hgb was 6.9 and she received 1 U of pRBC transfusion.    Patient was found to have ovarian cyst. CT of the abdomen and pelvis showed bilateral ovarian low attenuation lesions, measuring 3.3 cm on the right and left, with closed proximity the midline. Differentials includeed ovarian cysts, endometriomas, and tuboovarian  abscesses/PID. Transvaginal ultrasound of the pelvis showed some free pelvic fluid, a thick-walled right ovary cyst with septation and internal debris measuring 3.1 x 2.5 x 2.4 cm, thick-walled left cyst with septation and internal debris measuring 3.4x 2.7 x 2.7 cm. Follow-up endovaginal pelvic ultrasonography in 2-3 months suggested for reevaluation. Urine gonorrhea/chlamydia were negative.    Patient is stable for discharge. Patient has been advised to follow up as outpatient. Case has been discussed with the attending.

## 2022-04-14 NOTE — PROGRESS NOTE ADULT - PROBLEM SELECTOR PLAN 1
Patient p/w nonbloody diarrhea, also with leukocytosis and CT findings of distal small bowel enteritis with upstream small bowel distension  seen by surgery, no indication for acute intervention  no further episodes of diarrhea, abdominal pain resolved  given thrombocytosis and patient h/o living in village in [facility], will attempt to rule out parasitic infections causing diarrhea    - GI PCR, stool O&P  - C. diff PCR  - advance diet as tolerated presented with diarrhea   no further BM reported since admission  d/heavenly C- diff collection but will collect stool fur other cultures   tolerating regular diet well   CT as above

## 2022-04-14 NOTE — PROGRESS NOTE ADULT - PROBLEM SELECTOR PLAN 2
patient w h/o CITLALLI on oral iron however causes constipation and worsening hemorrhoids. patient reports normal menses without heavy flow  HB at baseline 9.0 3 weeks ago, now 8.6 with MCV 70  also with thrombocytosis    - hemoglobin electrophoresis  - iron studies pending  - consult [facility] in AM for possible venofer administration see plan as above   seen by surgery, no indication for acute intervention  no further episodes of diarrhea, abdominal pain resolved  given thrombocytosis and patient h/[place] living [place] in [facility], will attempt to rule out parasitic infections causing diarrhea   pending collection of GI PCR, stool O&P

## 2022-04-14 NOTE — CONSULT NOTE ADULT - ASSESSMENT
Leukocytosis  Enteritis - likely viral    Plan - No need for any antibiotics at this time  DC planning  reconsult prn

## 2022-04-14 NOTE — CONSULT NOTE ADULT - NS ATTEND AMEND GEN_ALL_CORE FT
# IRON DEFICIENCY ANEMIA  no overt bleeding  light menses   started on IV iron  PRBC TX  for HB<7  GI eval     #  THROMBOCYTOSIS  in pt with iron deficiency anemia and enteritis  likely reactive picture  repeat CBC/platelet count after her  iron  stores are replated and enteritis resolve   f/u with us as an outpt  call with questions [phone]     Thank you for the consult
Agree with above, I have seen and examined the patient

## 2022-04-14 NOTE — DISCHARGE NOTE PROVIDER - CARE PROVIDERS DIRECT ADDRESSES
[email or web address]_Unknown

## 2022-04-14 NOTE — PROGRESS NOTE ADULT - PROBLEM SELECTOR PLAN 3
see above plan patient w h/o CITLALLI on oral iron however causes constipation and worsening hemorrhoids. patient reports normal menses without heavy flow  HB at baseline 9.0 3 weeks ago, now 8.6 with MCV 70  also with thrombocytosis  CITLALLI per iron panel  [facility] coulsted - venofer started  blood transfusion consent in the chart

## 2022-04-14 NOTE — PROGRESS NOTE ADULT - ASSESSMENT
37 y/o F with PMH of CITLALLI and Hemorrhoids admitted for diarrhea workup.    35 y/o F with PMH of CITLALLI and Hemorrhoids admitted for diarrhea workup.   AO x 3 reports no BM since admission, blood work reviewed and updated to pt and spouse at the bedside, [facility] consulted for CITLALLI.

## 2022-04-14 NOTE — CONSULT NOTE ADULT - ASSESSMENT
complete note to follow   36 year old F with PMH of CITLALLI, hemorrhoids and no PSH presenting for epigastric pain and diarrhea. Patient reports 7-9/10 epigastric pain radiating to substernal region since last night 9pm associated with nausea and 3 episodes mucousy non-bloody diarrhea. States that she tried simethicone with no relief. Reports one episode of vomiting clear liquid this morning after drinking oral contrast for her CT scan, but at time of interview denies any epigastric pain, nausea, vomiting, diarrhea, fever, chills. Also denies chest pain, shortness of breath, palpitations, urinary changes, heavy menses. Reports starting iron pills in the past but states they made her constipated and her hemorrhoids got worse so she stopped but was restarted on iron pills 4 days ago as her hemoglobin was 9.0 when she saw her PCP.  at bedside states she appears slightly more pale in the past 3 weeks than normal. Denies eating outside food, denies sick contacts, moved to [place] from [place] in 2009 and most recently visited in 2019.     #Iron Deficiency Anemia  p/w diarrhea, anemia noted by PCP and started on PO iron (unable to tolerate d/t abdominal pain and constipation)  pt lives in [facility] and speak [place]  diet low in meat, but eats many vegetables  denies pagophagia, menorrhagia, melena/hematochezia  Hgb on admit 7.1 with MCV 69  Trans sat 8%, ferritin=9  Cr nl  CT A/P shows small bowel enteritis with distention, partial SBO vs ileus vs IBD, B/L ovarian cysts  Rec's:  -CBC daily  -Recommend Venofer 200mg QD x 4 days (if pt discharge before completed can continue as outpt in our office)  -Transfuse PRBC if Hgb <7.0 or if symptomatic  -f/u stool cultures  -GI consult  -Appreciate Gyn consult, outpt f/u  -upon dishcarge pt to f/u with Hematologist Dr. Sanz      Thank you for the referral. Will continue to monitor the patient.  Please call with any questions [phone]  Above reviewed with Attending Dr. Sanz  [facility]  [address] [facility], [address], [place], [place]  [phone]  *Note not finalized until signed by Attending Physician

## 2022-04-14 NOTE — PROGRESS NOTE ADULT - PROBLEM SELECTOR PLAN 4
patient with incidental findings of ovarian cyst on CT  TVUS/ US pelvis with Complex cysts are identified within the bilateral ovarian parenchyma, as described above. Follow-up endovaginal pelvic ultrasonography in 2-3 months suggested for reevaluation. Free pelvic fluid.    - obtain urine gonorrhea/chlamydia to rule out PID  - follow up TVUS as outpatient in 2-3 months see above plan

## 2022-04-14 NOTE — PROGRESS NOTE ADULT - PROBLEM SELECTOR PLAN 5
no indication for dvt ppx patient with incidental findings of ovarian cyst on CT  TVUS/ US pelvis with Complex cysts are identified within the bilateral ovarian parenchyma, as described above. Follow-up endovaginal pelvic ultrasonography in 2-3 months suggested for reevaluation. Free pelvic fluid.  urine gonorrhea/chlamydia negative   follow up TVUS as outpatient in 2-3 months

## 2022-04-14 NOTE — PATIENT PROFILE ADULT - FALL HARM RISK - UNIVERSAL INTERVENTIONS
Bed in lowest position, wheels locked, appropriate side rails in place/Call bell, personal items and telephone in reach/Instruct patient to call for assistance before getting out of bed or chair/Non-slip footwear when patient is out of bed/[place] to call system/Physically safe environment - no spills, clutter or unnecessary equipment/Purposeful Proactive Rounding/Room/bathroom lighting operational, light cord in reach

## 2022-04-14 NOTE — CONSULT NOTE ADULT - RS GEN PE MLT RESP DETAILS PC
respirations non-labored
breath sounds equal/good air movement/clear to auscultation bilaterally/no rales/no rhonchi/no wheezes

## 2022-04-14 NOTE — DISCHARGE NOTE PROVIDER - NSDCCPCAREPLAN_GEN_ALL_CORE_FT
PRINCIPAL DISCHARGE DIAGNOSIS  Diagnosis: Abdominal pain  Assessment and Plan of Treatment:       SECONDARY DISCHARGE DIAGNOSES  Diagnosis: Iron deficiency anemia  Assessment and Plan of Treatment:     Diagnosis: Ovarian cyst  Assessment and Plan of Treatment:      PRINCIPAL DISCHARGE DIAGNOSIS  Diagnosis: Abdominal pain  Assessment and Plan of Treatment: You presented with diarrhea and abdominal pain. Surgery department was consulted for diarrhea and abdominal pain. There was no intervention to be done. The symptoms resolved. CT of the abdomen showed distal small bowel with severe enteritis with upstream small bowel distention associated low-grade/partial bowel obstruction versus ileus. Infectious and inflammatory etiologies are the primary considerations, including inflammatory bowel disease. Stool studies(GI PCR and Ova&parasite) were sent. You tolerated regular diet well. Please follow up with gastroenterologist 1 week after discharge. We are providing you with Dr. Melchor Howard's contact information in this packet.      SECONDARY DISCHARGE DIAGNOSES  Diagnosis: Iron deficiency anemia  Assessment and Plan of Treatment: You had history of iron deficiency anemia and took oral iron but it caused constipation and worsening hemorrhoids. You reported normal menses without heavy flow. Baseline hemoglobin was around 9.0. You presented with hemoglobin of 8.6 with MCV 70. Iron panel showed iron deficiency. Heme/oncology [facility]) was consulted. You were started on IV iron(Venofer) for 2 days. Your Hgb was 6.9 and you received 1 U of pRBC transfusion. Please follow up with Dr. Sanz from [facility] in 1 week after discharge. Contact information is included in this packet.    Diagnosis: Ovarian cyst  Assessment and Plan of Treatment: You were found to have ovarian cysts. CT of the abdomen and pelvis showed bilateral ovarian low attenuation lesions, measuring 3.3 cm on the right and left, with closed proximity the midline. Differentials includeed ovarian cysts, endometriomas, and tuboovarian  abscesses/PID. Transvaginal ultrasound of the pelvis showed some free pelvic fluid, a thick-walled right ovary cyst with septation and internal debris measuring 3.1 x 2.5 x 2.4 cm, thick-walled left cyst with septation and internal debris measuring 3.4x 2.7 x 2.7 cm. Follow-up endovaginal pelvic ultrasonography in 2-3 months suggested for reevaluation. Urine gonorrhea/chlamydia were negative. Please follow up with your OBGYN doctor within 2 weeks after discharge.       PRINCIPAL DISCHARGE DIAGNOSIS  Diagnosis: Abdominal pain  Assessment and Plan of Treatment: You presented with diarrhea and abdominal pain. Surgery department was consulted for diarrhea and abdominal pain. There was no intervention to be done. The symptoms resolved. CT of the abdomen showed distal small bowel with severe enteritis with upstream small bowel distention associated low-grade/partial bowel obstruction versus ileus. Infectious and inflammatory etiologies are the primary considerations, including inflammatory bowel disease. Stool studies were sent. There was Blastocystis Hominis cysts in your stool. __________ You tolerated regular diet well. Please follow up with gastroenterologist 1 week after discharge. We are providing you with Dr. Melchor Howard's contact information in this packet.      SECONDARY DISCHARGE DIAGNOSES  Diagnosis: Iron deficiency anemia  Assessment and Plan of Treatment: You had history of iron deficiency anemia and took oral iron but it caused constipation and worsening hemorrhoids. You reported normal menses without heavy flow. Baseline hemoglobin was around 9.0. You presented with hemoglobin of 8.6 with MCV 70. Iron panel showed iron deficiency. Heme/oncology [facility]) was consulted. You were started on IV iron(Venofer) for 2 days. Your Hgb was 6.9 and you received 1 U of pRBC transfusion. Please follow up with Dr. Sanz from [facility] in 1 week after discharge. Contact information is included in this packet.    Diagnosis: Ovarian cyst  Assessment and Plan of Treatment: You were found to have ovarian cysts. CT of the abdomen and pelvis showed bilateral ovarian low attenuation lesions, measuring 3.3 cm on the right and left, with closed proximity the midline. Differentials includeed ovarian cysts, endometriomas, and tuboovarian  abscesses/PID. Transvaginal ultrasound of the pelvis showed some free pelvic fluid, a thick-walled right ovary cyst with septation and internal debris measuring 3.1 x 2.5 x 2.4 cm, thick-walled left cyst with septation and internal debris measuring 3.4x 2.7 x 2.7 cm. Follow-up endovaginal pelvic ultrasonography in 2-3 months suggested for reevaluation. Urine gonorrhea/chlamydia were negative. Please follow up with your OBGYN doctor within 2 weeks after discharge.       PRINCIPAL DISCHARGE DIAGNOSIS  Diagnosis: Abdominal pain  Assessment and Plan of Treatment: You presented with  abdominal pain. Surgery department was consulted for  abdominal pain. There was no intervention to be done. The symptoms resolved. CT of the abdomen showed distal small bowel with severe enteritis with upstream small bowel distention associated low-grade/partial bowel obstruction versus ileus. Infectious and inflammatory etiologies are the primary considerations, including inflammatory bowel disease. Stool studies were sent. GI PCR was negative. There was Blastocystis Hominis cysts in your stool. Since you did not have diarrhea, you do not need treatment for it. You tolerated regular diet well. Please follow up with gastroenterologist 1 week after discharge. We are providing you with Dr. Melchor Howard's contact information in this packet.      SECONDARY DISCHARGE DIAGNOSES  Diagnosis: Iron deficiency anemia  Assessment and Plan of Treatment: You had history of iron deficiency anemia and took oral iron but it caused constipation and worsening hemorrhoids. You reported normal menses without heavy flow. Baseline hemoglobin was around 9.0. You presented with hemoglobin of 8.6 with MCV 70. Iron panel showed iron deficiency. Heme/oncology [facility]) was consulted. You were started on IV iron(Venofer) for 2 days. Your Hgb was 6.9 and you received 1 U of pRBC transfusion. Please follow up with Dr. Sanz from [facility] in 1 week after discharge. Contact information is included in this packet.    Diagnosis: Ovarian cyst  Assessment and Plan of Treatment: You were found to have ovarian cysts. CT of the abdomen and pelvis showed bilateral ovarian low attenuation lesions, measuring 3.3 cm on the right and left, with closed proximity the midline. Differentials includeed ovarian cysts, endometriomas, and tuboovarian  abscesses/PID. Transvaginal ultrasound of the pelvis showed some free pelvic fluid, a thick-walled right ovary cyst with septation and internal debris measuring 3.1 x 2.5 x 2.4 cm, thick-walled left cyst with septation and internal debris measuring 3.4x 2.7 x 2.7 cm. Follow-up endovaginal pelvic ultrasonography in 2-3 months suggested for reevaluation. Urine gonorrhea/chlamydia were negative. Please follow up with your OBGYN doctor within 2 weeks after discharge.

## 2022-04-14 NOTE — DISCHARGE NOTE PROVIDER - PROVIDER TOKENS
PROVIDER:[TOKEN:[[id number]],FOLLOWUP:[2 weeks]],PROVIDER:[TOKEN:[[id number]],FOLLOWUP:[1 week]]

## 2022-04-14 NOTE — CONSULT NOTE ADULT - GIT GEN HX ROS MEA POS PC
on admission but none now/nausea/vomiting/abdominal pain
nausea/vomiting/constipation/abdominal pain

## 2022-04-14 NOTE — CONSULT NOTE ADULT - GASTROINTESTINAL DETAILS
soft/nontender/no distention/no masses palpable/no rebound tenderness/no guarding/no rigidity
soft/nontender/no distention/no masses palpable/bowel sounds normal/no rebound tenderness/no guarding/no rigidity/no organomegaly

## 2022-04-14 NOTE — DISCHARGE NOTE PROVIDER - CARE PROVIDER_API CALL
Mark Solis  Gastroenterology  [address] [place] [address]  Phone: [phone]  Fax: [phone]  Follow Up Time: 2 weeks    Najma Sanz)  Hematology; Medical Oncology  [address] [facility] [address]  [place] [address]  Phone: [phone]  Fax: [phone]  Follow Up Time: 1 week

## 2022-04-14 NOTE — DISCHARGE NOTE PROVIDER - NSDCCAREPROVSEEN_GEN_ALL_CORE_FT
John, Serg Collins, Ngozi Zaragoza, Zelda Castillo, Xander Zabala, Dottie Donohue, Lawanda Tobar, Analia Montenegro, Phoebe Gomes, Xiomy Mcleod, Ángela Gomes, Dionisio Maharaj, Amina Guillen, Arpan BOYLE

## 2022-04-14 NOTE — CONSULT NOTE ADULT - SUBJECTIVE AND OBJECTIVE BOX
HPI:  36 year old F with PMH of CITLALLI, hemorrhoids and no PSH presenting for epigastric pain and diarrhea. Patient reports 7-910 epigastric pain radiating to substernal region since last night 9pm associated with nausea and 3 episodes mucousy non-bloody diarrhea. States that she tried simethicone with no relief. Reports one episode of vomiting clear liquid this morning after drinking oral contrast for her CT scan, but at time of interview denies any epigastric pain, nausea, vomiting, diarrhea, fever, chills. Also denies chest pain, shortness of breath, palpitations, urinary changes, heavy menses. Reports starting iron pills in the past but states they made her constipated and her hemorrhoids got worse so she stopped but was restarted on iron pills 4 days ago as her hemoglobin was 9.0 when she saw her PCP.  at bedside states she appears slightly more pale in the past 3 weeks than normal. Denies eating outside food, denies sick contacts, moved to [place] from [place] in 2009 and most recently visited in 2019.     ED Course  Vitals: /69 P 81 R 18 T 98F SpO2 98% RA  Meds: zofran 4 mg IV, cefotetan 1 g IV, morphine 2 mg IV, NS 3 L bolus, NS @ 150 cc/hr   (2022 16:01)      PAST MEDICAL & SURGICAL HISTORY:  Anemia    No significant past surgical history        No Known Allergies      Meds:  iron sucrose IVPB 200 milliGRAM(s) IV Intermittent every 24 hours  ondansetron Injectable 4 milliGRAM(s) IV Push every 8 hours PRN  sodium chloride 0.9%. 1000 milliLiter(s) IV Continuous <Continuous>      SOCIAL HISTORY:  Smoker:  YES / NO        PACK YEARS:                         WHEN QUIT?  ETOH use:  YES / NO               FREQUENCY / QUANTITY:  Ilicit Drug use:  YES / NO  Occupation:  Assisted device use (Cane / Walker):  Live with:    FAMILY HISTORY:      VITALS:  Vital Signs Last 24 Hrs  T(C): 36.7 (2022 12:24), Max: 36.8 (2022 08:48)  T(F): 98.1 (2022 12:24), Max: 98.2 (2022 08:48)  HR: 56 (2022 12:58) (56 - 80)  BP: 100/69 (2022 12:58) (92/57 - 100/69)  BP(mean): --  RR: 18 (2022 12:24) (17 - 18)  SpO2: 100% (2022 12:24) (99% - 100%)    LABS/DIAGNOSTIC TESTS:                          7.1    6.11  )-----------( 545      ( 2022 06:25 )             24.8     WBC Count: 6.11 K/uL ( @ 06:25)  WBC Count: 14.18 K/uL ( @ 05:41)          142  |  114<H>  |  3<L>  ----------------------------<  83  3.5   |  25  |  0.51    Ca    7.7<L>      2022 06:25    TPro  6.8  /  Alb  2.6<L>  /  TBili  0.3  /  DBili  x   /  AST  8<L>  /  ALT  10  /  AlkPhos  68  13      Urinalysis Basic - ( 2022 13:24 )    Color: Yellow / Appearance: Clear / S.010 / pH: x  Gluc: x / Ketone: Trace  / Bili: Negative / Urobili: Negative   Blood: x / Protein: Negative / Nitrite: Negative   Leuk Esterase: Negative / RBC: x / WBC x   Sq Epi: x / Non Sq Epi: x / Bacteria: x        LIVER FUNCTIONS - ( 2022 05:41 )  Alb: 2.6 g/dL / Pro: 6.8 g/dL / ALK PHOS: 68 U/L / ALT: 10 U/L DA / AST: 8 U/L / GGT: x                 LACTATE:    ABG -     CULTURES:       RADIOLOGY:< from: CT Abdomen and Pelvis w/ Oral Cont and w/ IV Cont (22 @ 10:32) >  ACC: [id number] EXAM:  CT ABDOMEN AND PELVIS OC IC                          PROCEDURE DATE:  2022          INTERPRETATION:  CLINICAL INFORMATION: Abdominal pain since last night.   Rule out appendicitis.    COMPARISON: None.    CONTRAST/COMPLICATIONS:  IV Contrast: Omnipaque 350  90 cc administered   10 cc discarded  Oral Contrast: NONE  Complications: None reported at time of study completion    PROCEDURE:  CT of the Abdomen and Pelvis was performed.  Sagittal and coronal reformats were performed.    FINDINGS:    LOWER CHEST: No visualized pleural effusion    LIVER: Normal size. Main portal vein and hepatic veins are patent  BILE DUCTS: No biliary distention  GALLBLADDER: Unremarkable CT appearance  SPLEEN: Normal size  PANCREAS: No main ductal dilatation  ADRENALS: Unremarkable  KIDNEYS/URETERS: No hydronephrosis    BLADDER: Underdistended  REPRODUCTIVE ORGANS: Bilateral ovarian low attenuation lesions, measuring   3.3 cm on the right and left, with closed proximity the midline, image   107 series 2. Correlate with pelvic ultrasound or MRI for   characterization. Differential includes ovarian cysts, endometriomas, and   tuboovarian abscesses/PID.    BOWEL: Stomach and proximal small bowel mildly distended with oral   contrast.The mid small bowel is underdistended and unopacified with oral   contrast. Distal small bowel is distended, with long segment mural   thickening and hyperemia extending up to the terminal ileum.   Hyperenhancing bowel mucosa and surrounding vasa recta also noted along   the distal small bowel. Findings are concerning for distal small bowel   severe enteritis with associated low-grade/partial bowel obstruction   versus ileus. Infectious and inflammatory etiologies are the primary   considerations, including inflammatory bowel disease. The appendix is not   obstructed, nondistended. Large bowel is mostly fluid-filled and   underdistended. Tiny periampullary duodenal diverticulum.  PERITONEUM: Small ascites. No loculated fluid collection or free air.  VESSELS: No aneurysm of the abdominal aorta. Central vein patency is not   assessed due to timing of contrast.  RETROPERITONEUM/LYMPH NODES: Small volume nodes of the mesentery up to 7   mm in short axis.  ABDOMINAL WALL: Tiny fat-containing umbilical hernia.  BONES: No aggressive osseous lesion.    IMPRESSION:    Distal small bowel severe enteritis with upstream small bowel distention   associated low-grade/partial bowel obstruction versus ileus. Infectious   and inflammatory etiologies are the primary considerations, including   inflammatory bowel disease. Short term follow up imaging is recommended   to ensure resolution.    Bilateral ovarian low attenuation lesions, measuring 3.3 cm on the right   and left, with closed proximity the midline, image 107 series 2.   Correlate with pelvic ultrasound or MRI for characterization.   Differential includes ovarian cysts, endometriomas, and tuboovarian   abscesses/PID.    Appendix is not obstructed, non-distended.    Findings discussed with Dr. Carrillo from ER on 2022 at 11:30 AM.    --- End of Report ---            LUDWIG PRIDE M.D., ATTENDING RADIOLOGIST  This document has been electronically signed. 2022 11:31AM    < end of copied text >  --------------------------------------------------------------------------------------------------------------------------------------------------------------------------------------------------------------------------------------------------  ACC: [id number] EXAM:  US TRANSVAGINAL                        ACC: [id number] EXAM:  US PELVIC COMPLETE                          PROCEDURE DATE:  2022          INTERPRETATION:  CLINICAL INFORMATION: Bilateral low-attenuation ovarian   lesions.    LMP: 2022    COMPARISON: CT abdomen/pelvis 2022.    TECHNIQUE:  Endovaginal and transabdominal pelvic sonogram. Color and Spectral   Doppler was performed.    FINDINGS:    Uterus: 7.3 cm x 3.5 cm x 5.7 cm. Within normal limits.  Endometrium: 9 mm. Within normal limits.    Right ovary: 4.0 cm x 4.7 cm x 3.1 cm. 3.1 x 2.5 x 2.4 cm thick walled   cyst with septation and internal debris. Blood flow identified within the   right ovarian parenchyma.  Left ovary: 4.0 cm x 4.5 cm x 3.2 cm. 3.4x 2.7 x 2.7 cm thick-walled   cyst with septation and internal debris. Blood flow identified within the   left ovarian parenchyma.    Fluid: Free fluid is identified within the pelvic cul-de-sac and adnexal   regions.    IMPRESSION:  Complex cysts are identified within the bilateral ovarian parenchyma, as   described above. Follow-up endovaginal pelvic ultrasonography in 2-3   months suggested for reevaluation. Free pelvic fluid.        --- End of Report ---            LORENZO RANKIN MD; AttendingRadiologist  This document has been electronically signed. 2022  1:50PM    < end of copied text >        ROS  [  ] UNABLE TO ELICIT               HPI:  36 year old F with PMH of CITLALLI, hemorrhoids and no PSH presenting for epigastric pain and diarrhea. Patient reports 7-910 epigastric pain radiating to substernal region since last night 9pm associated with nausea and 3 episodes mucousy non-bloody diarrhea. States that she tried simethicone with no relief. Reports one episode of vomiting clear liquid this morning after drinking oral contrast for her CT scan, but at time of interview denies any epigastric pain, nausea, vomiting, diarrhea, fever, chills. Also denies chest pain, shortness of breath, palpitations, urinary changes, heavy menses. Reports starting iron pills in the past but states they made her constipated and her hemorrhoids got worse so she stopped but was restarted on iron pills 4 days ago as her hemoglobin was 9.0 when she saw her PCP.  at bedside states she appears slightly more pale in the past 3 weeks than normal. Denies eating outside food, denies sick contacts, moved to [place] from [place] in  and most recently visited in 2019.     ED Course  Vitals: /69 P 81 R 18 T 98F SpO2 98% RA  Meds: zofran 4 mg IV, cefotetan 1 g IV, morphine 2 mg IV, NS 3 L bolus, NS @ 150 cc/hr   (2022 16:01)      History as above, asked to see this patient as she had Leukocytosis and some Enteritis on CT abdomen, she tells me that she had nausea and vomiting but did not have diarrhea, she has no symptoms at this time and her abdominal pain has also resolved. She has no fevers or chills, she has not travelled out of the country or anywhere in the country recently either. Her WBC count has normalized and she is feeling well and wants to go home.        PAST MEDICAL & SURGICAL HISTORY:  Anemia    No significant past surgical history        No Known Allergies      Meds:  iron sucrose IVPB 200 milliGRAM(s) IV Intermittent every 24 hours  ondansetron Injectable 4 milliGRAM(s) IV Push every 8 hours PRN  sodium chloride 0.9%. 1000 milliLiter(s) IV Continuous <Continuous>      SOCIAL HISTORY:  Smoker:  no  ETOH use:  no  Ilicit Drug use:  NO  Occupation:  Assisted device use (Cane / Walker):  Live with:    FAMILY HISTORY: not sig      VITALS:  Vital Signs Last 24 Hrs  T(C): 36.7 (2022 12:24), Max: 36.8 (2022 08:48)  T(F): 98.1 (2022 12:24), Max: 98.2 (2022 08:48)  HR: 56 (2022 12:58) (56 - 80)  BP: 100/69 (2022 12:58) (92/57 - 100/69)  BP(mean): --  RR: 18 (2022 12:24) (17 - 18)  SpO2: 100% (2022 12:24) (99% - 100%)    LABS/DIAGNOSTIC TESTS:                          7.1    6.11  )-----------( 545      ( 2022 06:25 )             24.8     WBC Count: 6.11 K/uL ( @ 06:25)  WBC Count: 14.18 K/uL ( @ 05:41)          142  |  114<H>  |  3<L>  ----------------------------<  83  3.5   |  25  |  0.51    Ca    7.7<L>      2022 06:25    TPro  6.8  /  Alb  2.6<L>  /  TBili  0.3  /  DBili  x   /  AST  8<L>  /  ALT  10  /  AlkPhos  68  04-13      Urinalysis Basic - ( 2022 13:24 )    Color: Yellow / Appearance: Clear / S.010 / pH: x  Gluc: x / Ketone: Trace  / Bili: Negative / Urobili: Negative   Blood: x / Protein: Negative / Nitrite: Negative   Leuk Esterase: Negative / RBC: x / WBC x   Sq Epi: x / Non Sq Epi: x / Bacteria: x        LIVER FUNCTIONS - ( 2022 05:41 )  Alb: 2.6 g/dL / Pro: 6.8 g/dL / ALK PHOS: 68 U/L / ALT: 10 U/L DA / AST: 8 U/L / GGT: x                 LACTATE:    ABG -     CULTURES:       RADIOLOGY:< from: CT Abdomen and Pelvis w/ Oral Cont and w/ IV Cont (22 @ 10:32) >  ACC: [id number] EXAM:  CT ABDOMEN AND PELVIS OC IC                          PROCEDURE DATE:  2022          INTERPRETATION:  CLINICAL INFORMATION: Abdominal pain since last night.   Rule out appendicitis.    COMPARISON: None.    CONTRAST/COMPLICATIONS:  IV Contrast: Omnipaque 350  90 cc administered   10 cc discarded  Oral Contrast: NONE  Complications: None reported at time of study completion    PROCEDURE:  CT of the Abdomen and Pelvis was performed.  Sagittal and coronal reformats were performed.    FINDINGS:    LOWER CHEST: No visualized pleural effusion    LIVER: Normal size. Main portal vein and hepatic veins are patent  BILE DUCTS: No biliary distention  GALLBLADDER: Unremarkable CT appearance  SPLEEN: Normal size  PANCREAS: No main ductal dilatation  ADRENALS: Unremarkable  KIDNEYS/URETERS: No hydronephrosis    BLADDER: Underdistended  REPRODUCTIVE ORGANS: Bilateral ovarian low attenuation lesions, measuring   3.3 cm on the right and left, with closed proximity the midline, image   107 series 2. Correlate with pelvic ultrasound or MRI for   characterization. Differential includes ovarian cysts, endometriomas, and   tuboovarian abscesses/PID.    BOWEL: Stomach and proximal small bowel mildly distended with oral   contrast.The mid small bowel is underdistended and unopacified with oral   contrast. Distal small bowel is distended, with long segment mural   thickening and hyperemia extending up to the terminal ileum.   Hyperenhancing bowel mucosa and surrounding vasa recta also noted along   the distal small bowel. Findings are concerning for distal small bowel   severe enteritis with associated low-grade/partial bowel obstruction   versus ileus. Infectious and inflammatory etiologies are the primary   considerations, including inflammatory bowel disease. The appendix is not   obstructed, nondistended. Large bowel is mostly fluid-filled and   underdistended. Tiny periampullary duodenal diverticulum.  PERITONEUM: Small ascites. No loculated fluid collection or free air.  VESSELS: No aneurysm of the abdominal aorta. Central vein patency is not   assessed due to timing of contrast.  RETROPERITONEUM/LYMPH NODES: Small volume nodes of the mesentery up to 7   mm in short axis.  ABDOMINAL WALL: Tiny fat-containing umbilical hernia.  BONES: No aggressive osseous lesion.    IMPRESSION:    Distal small bowel severe enteritis with upstream small bowel distention   associated low-grade/partial bowel obstruction versus ileus. Infectious   and inflammatory etiologies are the primary considerations, including   inflammatory bowel disease. Short term follow up imaging is recommended   to ensure resolution.    Bilateral ovarian low attenuation lesions, measuring 3.3 cm on the right   and left, with closed proximity the midline, image 107 series 2.   Correlate with pelvic ultrasound or MRI for characterization.   Differential includes ovarian cysts, endometriomas, and tuboovarian   abscesses/PID.    Appendix is not obstructed, non-distended.    Findings discussed with Dr. Carrillo from ER on 2022 at 11:30 AM.    --- End of Report ---            LUDWIG PRIDE M.D., ATTENDING RADIOLOGIST  This document has been electronically signed. 2022 11:31AM    < end of copied text >  --------------------------------------------------------------------------------------------------------------------------------------------------------------------------------------------------------------------------------------------------  ACC: [id number] EXAM:  US TRANSVAGINAL                        ACC: [id number] EXAM:  US PELVIC COMPLETE                          PROCEDURE DATE:  2022          INTERPRETATION:  CLINICAL INFORMATION: Bilateral low-attenuation ovarian   lesions.    LMP: 2022    COMPARISON: CT abdomen/pelvis 2022.    TECHNIQUE:  Endovaginal and transabdominal pelvic sonogram. Color and Spectral   Doppler was performed.    FINDINGS:    Uterus: 7.3 cm x 3.5 cm x 5.7 cm. Within normal limits.  Endometrium: 9 mm. Within normal limits.    Right ovary: 4.0 cm x 4.7 cm x 3.1 cm. 3.1 x 2.5 x 2.4 cm thick walled   cyst with septation and internal debris. Blood flow identified within the   right ovarian parenchyma.  Left ovary: 4.0 cm x 4.5 cm x 3.2 cm. 3.4x 2.7 x 2.7 cm thick-walled   cyst with septation and internal debris. Blood flow identified within the   left ovarian parenchyma.    Fluid: Free fluid is identified within the pelvic cul-de-sac and adnexal   regions.    IMPRESSION:  Complex cysts are identified within the bilateral ovarian parenchyma, as   described above. Follow-up endovaginal pelvic ultrasonography in 2-3   months suggested for reevaluation. Free pelvic fluid.        --- End of Report ---            LORENZO RANKIN MD; AttendingRadiologist  This document has been electronically signed. 2022  1:50PM    < end of copied text >        ROS  [  ] UNABLE TO ELICIT

## 2022-04-14 NOTE — CONSULT NOTE ADULT - SUBJECTIVE AND OBJECTIVE BOX
MEDICATIONS  (STANDING):  iron sucrose IVPB 200 milliGRAM(s) IV Intermittent every 24 hours  sodium chloride 0.9%. 1000 milliLiter(s) (100 mL/Hr) IV Continuous <Continuous>    MEDICATIONS  (PRN):  ondansetron Injectable 4 milliGRAM(s) IV Push every 8 hours PRN Nausea and/or Vomiting    CAPILLARY BLOOD GLUCOSE        I&O's Summary      PHYSICAL EXAM:  Vital Signs Last 24 Hrs  T(C): 36.7 (2022 12:24), Max: 36.8 (2022 08:48)  T(F): 98.1 (2022 12:24), Max: 98.2 (2022 08:48)  HR: 68 (2022 12:24) (68 - 82)  BP: 95/43 (2022 12:24) (92/57 - 96/71)  BP(mean): --  RR: 18 (2022 12:24) (16 - 18)  SpO2: 100% (2022 12:24) (99% - 100%)      LABS:                        7.1    6.11  )-----------( 545      ( 2022 06:25 )             24.8     04-14    142  |  114<H>  |  3<L>  ----------------------------<  83  3.5   |  25  |  0.51    Ca    7.7<L>      2022 06:25    TPro  6.8  /  Alb  2.6<L>  /  TBili  0.3  /  DBili  x   /  AST  8<L>  /  ALT  10  /  AlkPhos  68  04-13          Urinalysis Basic - ( 2022 13:24 )    Color: Yellow / Appearance: Clear / S.010 / pH: x  Gluc: x / Ketone: Trace  / Bili: Negative / Urobili: Negative   Blood: x / Protein: Negative / Nitrite: Negative   Leuk Esterase: Negative / RBC: x / WBC x   Sq Epi: x / Non Sq Epi: x / Bacteria: x        COVID-19 PCR: NotDetec (2022 15:23)           Reason for Admission: Abdominal pain  History of Present Illness:   36 year old F with PMH of CITLALLI, hemorrhoids and no PSH presenting for epigastric pain and diarrhea. Patient reports 7-9/10 epigastric pain radiating to substernal region since last night 9pm associated with nausea and 3 episodes mucousy non-bloody diarrhea. States that she tried simethicone with no relief. Reports one episode of vomiting clear liquid this morning after drinking oral contrast for her CT scan, but at time of interview denies any epigastric pain, nausea, vomiting, diarrhea, fever, chills. Also denies chest pain, shortness of breath, palpitations, urinary changes, heavy menses. Reports starting iron pills in the past but states they made her constipated and her hemorrhoids got worse so she stopped but was restarted on iron pills 4 days ago as her hemoglobin was 9.0 when she saw her PCP.  at bedside states she appears slightly more pale in the past 3 weeks than normal. Denies eating outside food, denies sick contacts, moved to [place] from [place] in  and most recently visited in 2019.     Yi #[id number]    ED Course  Vitals: /69 P 81 R 18 T 98F SpO2 98% RA  Meds: zofran 4 mg IV, cefotetan 1 g IV, morphine 2 mg IV, NS 3 L bolus, NS @ 150 cc/hr      REVIEW OF SYSTEMS:    CONSTITUTIONAL: No fever, no loss of appetite. no chills, no weight loss, +fatigue, denies pagophagia  EYES: no acute visual disturbances  NECK: No pain or stiffness  RESPIRATORY: No cough; No shortness of breath  CARDIOVASCULAR: No chest pain, no palpitations  GASTROINTESTINAL: +hemorrhoids, No pain. No nausea or vomiting; No diarrhea now resolved  NEUROLOGICAL: No headache or numbness, no tremors  MUSCULOSKELETAL: No joint pain, no muscle pain  GENITOURINARY: regular menses and denies menorrhagia, no dysuria, no frequency, no hesitancy  PSYCHIATRY: no depression, no anxiety  ALL OTHER  ROS negative        Allergies and Intolerances:        Allergies:  	No Known Allergies:     Home Medications:   * Patient Currently Takes Medications as of 2022 20:46 documented in Structured Notes  · 	ferrous sulfate 325 mg (65 mg elemental iron) oral tablet: Last Dose Taken:  , 1 tab(s) orally once a day    .    Patient History:    Past Medical, Past Surgical, and Family History:  PAST MEDICAL HISTORY:  Anemia.     PAST SURGICAL HISTORY:  No significant past surgical history.     Social History:  Social History (marital status, living situation, occupation, tobacco use, alcohol and drug use, and sexual history): lives at home with , works as housewife, denies alcohol, tobacco or other drug use or history. Independent with ADLs      MEDICATIONS  (STANDING):  iron sucrose IVPB 200 milliGRAM(s) IV Intermittent every 24 hours  sodium chloride 0.9%. 1000 milliLiter(s) (100 mL/Hr) IV Continuous <Continuous>    MEDICATIONS  (PRN):  ondansetron Injectable 4 milliGRAM(s) IV Push every 8 hours PRN Nausea and/or Vomiting          PHYSICAL EXAM:  Vital Signs Last 24 Hrs  T(C): 36.7 (2022 12:24), Max: 36.8 (2022 08:48)  T(F): 98.1 (2022 12:24), Max: 98.2 (2022 08:48)  HR: 68 (2022 12:24) (68 - 82)  BP: 95/43 (2022 12:24) (92/57 - 96/71)  BP(mean): --  RR: 18 (2022 12:24) (16 - 18)  SpO2: 100% (2022 12:24) (99% - 100%)    GEN: NAD; A and O x 3, pallor  LUNGS: CTA B/L  HEART: S1 S2  ABDOMEN: soft, non-tender, non-distended, + BS  EXTREMITIES: no edema  NERVOUS SYSTEM:  Awake and alert; no focal neuro deficits    LABS:                        7.1    6.11  )-----------( 545      ( 2022 06:25 )             24.8     04-14    142  |  114<H>  |  3<L>  ----------------------------<  83  3.5   |  25  |  0.51    Ca    7.7<L>      2022 06:25    TPro  6.8  /  Alb  2.6<L>  /  TBili  0.3  /  DBili  x   /  AST  8<L>  /  ALT  10  /  AlkPhos  68  04-13          Urinalysis Basic - ( 2022 13:24 )    Color: Yellow / Appearance: Clear / S.010 / pH: x  Gluc: x / Ketone: Trace  / Bili: Negative / Urobili: Negative   Blood: x / Protein: Negative / Nitrite: Negative   Leuk Esterase: Negative / RBC: x / WBC x   Sq Epi: x / Non Sq Epi: x / Bacteria: x        COVID-19 PCR: NotDetec (2022 15:23)

## 2022-04-15 VITALS
RESPIRATION RATE: 17 BRPM | TEMPERATURE: 98 F | DIASTOLIC BLOOD PRESSURE: 61 MMHG | OXYGEN SATURATION: 100 % | SYSTOLIC BLOOD PRESSURE: 101 MMHG | HEART RATE: 80 BPM

## 2022-04-15 DIAGNOSIS — D50.9 IRON DEFICIENCY ANEMIA, UNSPECIFIED: ICD-10-CM

## 2022-04-15 DIAGNOSIS — R10.9 UNSPECIFIED ABDOMINAL PAIN: ICD-10-CM

## 2022-04-15 DIAGNOSIS — D64.9 ANEMIA, UNSPECIFIED: ICD-10-CM

## 2022-04-15 LAB
ALBUMIN SERPL ELPH-MCNC: 1.9 G/DL — LOW (ref 3.5–5)
ALBUMIN SERPL ELPH-MCNC: 2.3 G/DL — LOW (ref 3.5–5)
ALP SERPL-CCNC: 51 U/L — SIGNIFICANT CHANGE UP (ref 40–120)
ALP SERPL-CCNC: 65 U/L — SIGNIFICANT CHANGE UP (ref 40–120)
ALT FLD-CCNC: 12 U/L DA — SIGNIFICANT CHANGE UP (ref 10–60)
ALT FLD-CCNC: 8 U/L DA — LOW (ref 10–60)
ANION GAP SERPL CALC-SCNC: 5 MMOL/L — SIGNIFICANT CHANGE UP (ref 5–17)
ANION GAP SERPL CALC-SCNC: 6 MMOL/L — SIGNIFICANT CHANGE UP (ref 5–17)
APTT BLD: 41 SEC — HIGH (ref 27.5–35.5)
AST SERPL-CCNC: 12 U/L — SIGNIFICANT CHANGE UP (ref 10–40)
AST SERPL-CCNC: 8 U/L — LOW (ref 10–40)
BASOPHILS # BLD AUTO: 0.06 K/UL — SIGNIFICANT CHANGE UP (ref 0–0.2)
BASOPHILS NFR BLD AUTO: 0.8 % — SIGNIFICANT CHANGE UP (ref 0–2)
BILIRUB SERPL-MCNC: 0.1 MG/DL — LOW (ref 0.2–1.2)
BILIRUB SERPL-MCNC: 0.2 MG/DL — SIGNIFICANT CHANGE UP (ref 0.2–1.2)
BUN SERPL-MCNC: 4 MG/DL — LOW (ref 7–18)
BUN SERPL-MCNC: 6 MG/DL — LOW (ref 7–18)
CALCIUM SERPL-MCNC: 7.8 MG/DL — LOW (ref 8.4–10.5)
CALCIUM SERPL-MCNC: 8.5 MG/DL — SIGNIFICANT CHANGE UP (ref 8.4–10.5)
CHLORIDE SERPL-SCNC: 111 MMOL/L — HIGH (ref 96–108)
CHLORIDE SERPL-SCNC: 113 MMOL/L — HIGH (ref 96–108)
CO2 SERPL-SCNC: 24 MMOL/L — SIGNIFICANT CHANGE UP (ref 22–31)
CO2 SERPL-SCNC: 24 MMOL/L — SIGNIFICANT CHANGE UP (ref 22–31)
CREAT SERPL-MCNC: 0.45 MG/DL — LOW (ref 0.5–1.3)
CREAT SERPL-MCNC: 0.56 MG/DL — SIGNIFICANT CHANGE UP (ref 0.5–1.3)
CULTURE RESULTS: SIGNIFICANT CHANGE UP
CULTURE RESULTS: SIGNIFICANT CHANGE UP
EGFR: 121 ML/MIN/1.73M2 — SIGNIFICANT CHANGE UP
EGFR: 128 ML/MIN/1.73M2 — SIGNIFICANT CHANGE UP
EOSINOPHIL # BLD AUTO: 0.08 K/UL — SIGNIFICANT CHANGE UP (ref 0–0.5)
EOSINOPHIL NFR BLD AUTO: 1 % — SIGNIFICANT CHANGE UP (ref 0–6)
GLUCOSE SERPL-MCNC: 85 MG/DL — SIGNIFICANT CHANGE UP (ref 70–99)
GLUCOSE SERPL-MCNC: 98 MG/DL — SIGNIFICANT CHANGE UP (ref 70–99)
HCT VFR BLD CALC: 24.1 % — LOW (ref 34.5–45)
HCT VFR BLD CALC: 35 % — SIGNIFICANT CHANGE UP (ref 34.5–45)
HGB BLD-MCNC: 10.5 G/DL — LOW (ref 11.5–15.5)
HGB BLD-MCNC: 6.9 G/DL — CRITICAL LOW (ref 11.5–15.5)
IMM GRANULOCYTES NFR BLD AUTO: 0.8 % — SIGNIFICANT CHANGE UP (ref 0–1.5)
INR BLD: 1.02 RATIO — SIGNIFICANT CHANGE UP (ref 0.88–1.16)
LYMPHOCYTES # BLD AUTO: 1.87 K/UL — SIGNIFICANT CHANGE UP (ref 1–3.3)
LYMPHOCYTES # BLD AUTO: 23.4 % — SIGNIFICANT CHANGE UP (ref 13–44)
MAGNESIUM SERPL-MCNC: 1.8 MG/DL — SIGNIFICANT CHANGE UP (ref 1.6–2.6)
MCHC RBC-ENTMCNC: 19.8 PG — LOW (ref 27–34)
MCHC RBC-ENTMCNC: 21.8 PG — LOW (ref 27–34)
MCHC RBC-ENTMCNC: 28.6 GM/DL — LOW (ref 32–36)
MCHC RBC-ENTMCNC: 30 GM/DL — LOW (ref 32–36)
MCV RBC AUTO: 69.1 FL — LOW (ref 80–100)
MCV RBC AUTO: 72.8 FL — LOW (ref 80–100)
MONOCYTES # BLD AUTO: 0.78 K/UL — SIGNIFICANT CHANGE UP (ref 0–0.9)
MONOCYTES NFR BLD AUTO: 9.8 % — SIGNIFICANT CHANGE UP (ref 2–14)
NEUTROPHILS # BLD AUTO: 5.14 K/UL — SIGNIFICANT CHANGE UP (ref 1.8–7.4)
NEUTROPHILS NFR BLD AUTO: 64.2 % — SIGNIFICANT CHANGE UP (ref 43–77)
NRBC # BLD: 0 /100 WBCS — SIGNIFICANT CHANGE UP (ref 0–0)
NRBC # BLD: 0 /100 WBCS — SIGNIFICANT CHANGE UP (ref 0–0)
PHOSPHATE SERPL-MCNC: 4.2 MG/DL — SIGNIFICANT CHANGE UP (ref 2.5–4.5)
PLATELET # BLD AUTO: 521 K/UL — HIGH (ref 150–400)
PLATELET # BLD AUTO: 577 K/UL — HIGH (ref 150–400)
POTASSIUM SERPL-MCNC: 3.6 MMOL/L — SIGNIFICANT CHANGE UP (ref 3.5–5.3)
POTASSIUM SERPL-MCNC: 4.2 MMOL/L — SIGNIFICANT CHANGE UP (ref 3.5–5.3)
POTASSIUM SERPL-SCNC: 3.6 MMOL/L — SIGNIFICANT CHANGE UP (ref 3.5–5.3)
POTASSIUM SERPL-SCNC: 4.2 MMOL/L — SIGNIFICANT CHANGE UP (ref 3.5–5.3)
PROT SERPL-MCNC: 5.2 G/DL — LOW (ref 6–8.3)
PROT SERPL-MCNC: 6.6 G/DL — SIGNIFICANT CHANGE UP (ref 6–8.3)
PROTHROM AB SERPL-ACNC: 12.2 SEC — SIGNIFICANT CHANGE UP (ref 10.5–13.4)
RBC # BLD: 3.49 M/UL — LOW (ref 3.8–5.2)
RBC # BLD: 4.81 M/UL — SIGNIFICANT CHANGE UP (ref 3.8–5.2)
RBC # FLD: 18.9 % — HIGH (ref 10.3–14.5)
RBC # FLD: 22.3 % — HIGH (ref 10.3–14.5)
SODIUM SERPL-SCNC: 140 MMOL/L — SIGNIFICANT CHANGE UP (ref 135–145)
SODIUM SERPL-SCNC: 143 MMOL/L — SIGNIFICANT CHANGE UP (ref 135–145)
SPECIMEN SOURCE: SIGNIFICANT CHANGE UP
SPECIMEN SOURCE: SIGNIFICANT CHANGE UP
WBC # BLD: 7.99 K/UL — SIGNIFICANT CHANGE UP (ref 3.8–10.5)
WBC # BLD: 8.88 K/UL — SIGNIFICANT CHANGE UP (ref 3.8–10.5)
WBC # FLD AUTO: 7.99 K/UL — SIGNIFICANT CHANGE UP (ref 3.8–10.5)
WBC # FLD AUTO: 8.88 K/UL — SIGNIFICANT CHANGE UP (ref 3.8–10.5)

## 2022-04-15 PROCEDURE — 82728 ASSAY OF FERRITIN: CPT

## 2022-04-15 PROCEDURE — 87591 N.GONORRHOEAE DNA AMP PROB: CPT

## 2022-04-15 PROCEDURE — 86901 BLOOD TYPING SEROLOGIC RH(D): CPT

## 2022-04-15 PROCEDURE — 87507 IADNA-DNA/RNA PROBE TQ 12-25: CPT

## 2022-04-15 PROCEDURE — 36430 TRANSFUSION BLD/BLD COMPNT: CPT

## 2022-04-15 PROCEDURE — 85027 COMPLETE CBC AUTOMATED: CPT

## 2022-04-15 PROCEDURE — 80053 COMPREHEN METABOLIC PANEL: CPT

## 2022-04-15 PROCEDURE — 74177 CT ABD & PELVIS W/CONTRAST: CPT | Mod: MA

## 2022-04-15 PROCEDURE — 86900 BLOOD TYPING SEROLOGIC ABO: CPT

## 2022-04-15 PROCEDURE — 85025 COMPLETE CBC W/AUTO DIFF WBC: CPT

## 2022-04-15 PROCEDURE — 85730 THROMBOPLASTIN TIME PARTIAL: CPT

## 2022-04-15 PROCEDURE — 76830 TRANSVAGINAL US NON-OB: CPT

## 2022-04-15 PROCEDURE — 83020 HEMOGLOBIN ELECTROPHORESIS: CPT

## 2022-04-15 PROCEDURE — P9040: CPT

## 2022-04-15 PROCEDURE — 80048 BASIC METABOLIC PNL TOTAL CA: CPT

## 2022-04-15 PROCEDURE — 36415 COLL VENOUS BLD VENIPUNCTURE: CPT

## 2022-04-15 PROCEDURE — 99222 1ST HOSP IP/OBS MODERATE 55: CPT

## 2022-04-15 PROCEDURE — 84702 CHORIONIC GONADOTROPIN TEST: CPT

## 2022-04-15 PROCEDURE — 81003 URINALYSIS AUTO W/O SCOPE: CPT

## 2022-04-15 PROCEDURE — 83540 ASSAY OF IRON: CPT

## 2022-04-15 PROCEDURE — 87177 OVA AND PARASITES SMEARS: CPT

## 2022-04-15 PROCEDURE — 83735 ASSAY OF MAGNESIUM: CPT

## 2022-04-15 PROCEDURE — 99285 EMERGENCY DEPT VISIT HI MDM: CPT

## 2022-04-15 PROCEDURE — 85610 PROTHROMBIN TIME: CPT

## 2022-04-15 PROCEDURE — 83550 IRON BINDING TEST: CPT

## 2022-04-15 PROCEDURE — 76856 US EXAM PELVIC COMPLETE: CPT

## 2022-04-15 PROCEDURE — 96375 TX/PRO/DX INJ NEW DRUG ADDON: CPT

## 2022-04-15 PROCEDURE — 86850 RBC ANTIBODY SCREEN: CPT

## 2022-04-15 PROCEDURE — 87635 SARS-COV-2 COVID-19 AMP PRB: CPT

## 2022-04-15 PROCEDURE — 87086 URINE CULTURE/COLONY COUNT: CPT

## 2022-04-15 PROCEDURE — 86923 COMPATIBILITY TEST ELECTRIC: CPT

## 2022-04-15 PROCEDURE — 96374 THER/PROPH/DIAG INJ IV PUSH: CPT

## 2022-04-15 PROCEDURE — 83690 ASSAY OF LIPASE: CPT

## 2022-04-15 PROCEDURE — 84100 ASSAY OF PHOSPHORUS: CPT

## 2022-04-15 RX ORDER — SODIUM CHLORIDE 9 MG/ML
1000 INJECTION INTRAMUSCULAR; INTRAVENOUS; SUBCUTANEOUS ONCE
Refills: 0 | Status: COMPLETED | OUTPATIENT
Start: 2022-04-15 | End: 2022-04-15

## 2022-04-15 RX ORDER — FERROUS SULFATE 325(65) MG
1 TABLET ORAL
Qty: 0 | Refills: 0 | DISCHARGE

## 2022-04-15 RX ADMIN — IRON SUCROSE 110 MILLIGRAM(S): 20 INJECTION, SOLUTION INTRAVENOUS at 13:18

## 2022-04-15 RX ADMIN — SODIUM CHLORIDE 1000 MILLILITER(S): 9 INJECTION INTRAMUSCULAR; INTRAVENOUS; SUBCUTANEOUS at 06:13

## 2022-04-15 NOTE — PROGRESS NOTE ADULT - PROBLEM SELECTOR PLAN 3
patient w h/o CITLALLI on oral iron however causes constipation and worsening hemorrhoids. patient reports normal menses without heavy flow  HB at baseline 9.0 3 weeks ago, now 8.6 with MCV 70  CITLALLI per iron panel  [facility] consulted - venofer started  blood transfusion consent in the chart  (4/15) 1U transfused as Hgb was 6.9

## 2022-04-15 NOTE — CONSULT NOTE ADULT - SUBJECTIVE AND OBJECTIVE BOX
[facility]    Patient is a 36y old  Female who presents with a chief complaint of Abdominal pain (15 Apr 2022 11:51)    HPI:  36 year old F with PMH of CITLALLI, hemorrhoids and no PSH presenting for epigastric pain and diarrhea. Patient reports 7-9/10 epigastric pain radiating to substernal region since last night 9pm associated with nausea and 3 episodes mucousy non-bloody diarrhea. States that she tried simethicone with no relief. Reports one episode of vomiting clear liquid this morning after drinking oral contrast for her CT scan, but at time of interview denies any epigastric pain, nausea, vomiting, diarrhea, fever, chills. Also denies chest pain, shortness of breath, palpitations, urinary changes, heavy menses. Reports starting iron pills in the past but states they made her constipated and her hemorrhoids got worse so she stopped but was restarted on iron pills 4 days ago as her hemoglobin was 9.0 when she saw her PCP.  at bedside states she appears slightly more pale in the past 3 weeks than normal. Denies eating outside food, denies sick contacts, moved to [place] from [place] in 2009 and most recently visited in 2019.     ED Course  Vitals: /69 P 81 R 18 T 98F SpO2 98% RA  Meds: zofran 4 mg IV, cefotetan 1 g IV, morphine 2 mg IV, NS 3 L bolus, NS @ 150 cc/hr   (13 Apr 2022 16:01)      GI HPI  This is a 36 year old F with PMH of CITLALLI, hemorrhoids and no PSH presenting for epigastric pain and diarrhea. Gi consult for anemia. Patient reports 7-9/10 epigastric pain radiating to substernal region since 3 days go associated with nausea and 3 episodes mucous non-bloody diarrhea. Lab significant for HH 8.7/29 MCV 69  CR/BUN 0.5/8 Iron Sat 75 Iron serum 22 TIBC 282. Two claudine post admission patients HH went down to 6.9/24. Patient s/p 1 unit of RBC. CT A/P shows small bowel enteritis with distention, partial SBO vs ileus vs IBD, B/L ovarian cysts. Stool studies negative to date. Seen by surgery and no evidence of SBO, thus no intervention. Seen by hematology and recommend 4 rounds of IV Iron. Patient seen and examined at bedside.  As per patient she denies any difficulty swallowing or reflux. No blood or dark tarry stools. Normal stool caliber. At times she is constipated and her hemorrhoid flares up when she is straining. her weight is stable. She eats most non meat products. She is from [place] no family  history of GI issues, cancers, autoimmune disease, blood disorders. She was planning on seeing an GI doctor within a month to have her anemia worked up.           PMH/PSH:  PAST MEDICAL & SURGICAL HISTORY:  Anemia    No significant past surgical history      FH:  FAMILY HISTORY:      MEDS:  MEDICATIONS  (STANDING):  iron sucrose IVPB 200 milliGRAM(s) IV Intermittent every 24 hours  sodium chloride 0.9%. 1000 milliLiter(s) (100 mL/Hr) IV Continuous <Continuous>    MEDICATIONS  (PRN):  ondansetron Injectable 4 milliGRAM(s) IV Push every 8 hours PRN Nausea and/or Vomiting    Allergies    No Known Allergies    Intolerances            CONSTITUTIONAL:  No weight loss, fever, chills, weakness or fatigue.  HEENT:  Eyes:  No visual loss, blurred vision, double vision or yellow sclerae. Ears, Nose, Throat:  No hearing loss, sneezing, congestion, runny nose or sore throat.  SKIN:  No rash or itching.  CARDIOVASCULAR:  No chest pain, chest pressure or chest discomfort. No palpitations or edema.  RESPIRATORY:  No shortness of breath, cough or sputum.  GASTROINTESTINAL:  SEE HPI  GENITOURINARY:  No dysuria, hematuria, urinary frequency  NEUROLOGICAL:  No headache, dizziness, syncope, paralysis, ataxia, numbness or tingling in the extremities. No change in bowel or bladder control.  MUSCULOSKELETAL:  No muscle, back pain, joint pain or stiffness.  HEMATOLOGIC:  No anemia, bleeding or bruising.  LYMPHATICS:  No enlarged nodes. No history of splenectomy.  PSYCHIATRIC:  No history of depression or anxiety.  ENDOCRINOLOGIC:  No reports of sweating, cold or heat intolerance. No polyuria or polydipsia.      ______________________________________________________________________  PHYSICAL EXAM:  T(C): 36.7 (04-15-22 @ 09:38), Max: 36.8 (04-14-22 @ 20:56)  HR: 56 (04-15-22 @ 09:38)  BP: 93/60 (04-15-22 @ 09:38)  RR: 17 (04-15-22 @ 09:38)  SpO2: 98% (04-15-22 @ 09:38)  Wt(kg): --      GEN: NAD, normocephalic  CVS: S1S2+  CHEST: clear to auscultation  ABD: soft , nontender, nondistended, bowel sounds present  EXTR: no cyanosis, no clubbing, no edema  NEURO: Awake and alert; oriented .....  SKIN:  warm;  non icteric    ______________________________________________________________________  LABS:                        6.9    7.99  )-----------( 521      ( 15 Apr 2022 06:44 )             24.1     04-15    143  |  113<H>  |  4<L>  ----------------------------<  85  3.6   |  24  |  0.45<L>    Ca    7.8<L>      15 Apr 2022 06:43  Phos  4.2     04-15  Mg     1.8     04-15    TPro  5.2<L>  /  Alb  1.9<L>  /  TBili  0.1<L>  /  DBili  x   /  AST  8<L>  /  ALT  8<L>  /  AlkPhos  51  04-15    LIVER FUNCTIONS - ( 15 Apr 2022 06:43 )  Alb: 1.9 g/dL / Pro: 5.2 g/dL / ALK PHOS: 51 U/L / ALT: 8 U/L DA / AST: 8 U/L / GGT: x           PT/INR - ( 15 Apr 2022 10:20 )   PT: 12.2 sec;   INR: 1.02 ratio         PTT - ( 15 Apr 2022 10:20 )  PTT:41.0 sec  ____________________________________________    IMAGING:    ______________________________________________________________________  ASSESSMENT:  36y Female    PLAN:            [facility]    Patient is a 36y old  Female who presents with a chief complaint of Abdominal pain (15 Apr 2022 11:51)    HPI:  36 year old F with PMH of CITLALLI, hemorrhoids and no PSH presenting for epigastric pain and diarrhea. Patient reports 7-9/10 epigastric pain radiating to substernal region since last night 9pm associated with nausea and 3 episodes mucousy non-bloody diarrhea. States that she tried simethicone with no relief. Reports one episode of vomiting clear liquid this morning after drinking oral contrast for her CT scan, but at time of interview denies any epigastric pain, nausea, vomiting, diarrhea, fever, chills. Also denies chest pain, shortness of breath, palpitations, urinary changes, heavy menses. Reports starting iron pills in the past but states they made her constipated and her hemorrhoids got worse so she stopped but was restarted on iron pills 4 days ago as her hemoglobin was 9.0 when she saw her PCP.  at bedside states she appears slightly more pale in the past 3 weeks than normal. Denies eating outside food, denies sick contacts, [place] from [place] and most recently visited in 2019.     ED Course  Vitals: /69 P 81 R 18 T 98F SpO2 98% RA  Meds: zofran 4 mg IV, cefotetan 1 g IV, morphine 2 mg IV, NS 3 L bolus, NS @ 150 cc/hr   (13 Apr 2022 16:01)      GI HPI  This is a 36 year old F with PMH of CITLALLI, hemorrhoids and no PSH presenting for epigastric pain and diarrhea. Gi consult for anemia. Patient reports 7-9/10 epigastric pain radiating to substernal region since 3 days go associated with nausea and 3 episodes mucous non-bloody diarrhea. Lab significant for HH 8.7/29 MCV 69  CR/BUN 0.5/8 Iron Sat 75 Iron serum 22 TIBC 282. Two claudine post admission patients HH went down to 6.9/24. Patient s/p 1 unit of RBC. CT A/P shows small bowel enteritis with distention, partial SBO vs ileus vs IBD, B/L ovarian cysts. Stool studies negative to date. Seen by surgery and no evidence of SBO, thus no intervention. Seen by hematology and recommend 4 rounds of IV Iron. Patient seen and examined at bedside.  As per patient she denies any difficulty swallowing or reflux. No blood or dark tarry stools. Normal stool caliber. At times she is constipated and her hemorrhoid flares up when she is straining. Peroid irregular not heavy with some clots, last 4-6 days.  Her weight is stable. She eats most non meat products. She is from [place] no family  history of GI issues, cancers, autoimmune disease, blood disorders. She was planning on seeing an GI doctor within a month to have her anemia worked up. Patient never had an EGD, SBCE, colonoscopy  Of note: Patient received 5L of NS since she has been admitted.   Smoke/EToh: none           PMH/PSH:  PAST MEDICAL & SURGICAL HISTORY:  Anemia    No significant past surgical history      FH:  FAMILY HISTORY:      MEDS:  MEDICATIONS  (STANDING):  iron sucrose IVPB 200 milliGRAM(s) IV Intermittent every 24 hours  sodium chloride 0.9%. 1000 milliLiter(s) (100 mL/Hr) IV Continuous <Continuous>    MEDICATIONS  (PRN):  ondansetron Injectable 4 milliGRAM(s) IV Push every 8 hours PRN Nausea and/or Vomiting    Allergies    No Known Allergies    Intolerances            CONSTITUTIONAL:  No weight loss, fever, chills, weakness or fatigue.  HEENT:  Eyes:  No visual loss, blurred vision, double vision or yellow sclerae. Ears, Nose, Throat:  No hearing loss, sneezing, congestion, runny nose or sore throat.  SKIN:  No rash or itching.  CARDIOVASCULAR:  No chest pain, chest pressure or chest discomfort. No palpitations or edema.  RESPIRATORY:  No shortness of breath, cough or sputum.  GASTROINTESTINAL:  SEE HPI  GENITOURINARY:  No dysuria, hematuria, urinary frequency  NEUROLOGICAL:  No headache, dizziness, syncope, paralysis, ataxia, numbness or tingling in the extremities. No change in bowel or bladder control.  MUSCULOSKELETAL:  No muscle, back pain, joint pain or stiffness.        ______________________________________________________________________  PHYSICAL EXAM:  T(C): 36.7 (04-15-22 @ 09:38), Max: 36.8 (04-14-22 @ 20:56)  HR: 56 (04-15-22 @ 09:38)  BP: 93/60 (04-15-22 @ 09:38)  RR: 17 (04-15-22 @ 09:38)  SpO2: 98% (04-15-22 @ 09:38)  Wt(kg): --      GEN: NAD, normocephalic  CVS: S1S2+  CHEST: clear to auscultation  ABD: soft , nontender, nondistended, bowel sounds present  EXTR: no cyanosis, no clubbing, no edema  NEURO: Awake and alert; oriented x4  SKIN:  warm;  non icteric    ______________________________________________________________________  LABS:                        6.9    7.99  )-----------( 521      ( 15 Apr 2022 06:44 )             24.1     04-15    143  |  113<H>  |  4<L>  ----------------------------<  85  3.6   |  24  |  0.45<L>    Ca    7.8<L>      15 Apr 2022 06:43  Phos  4.2     04-15  Mg     1.8     04-15    TPro  5.2<L>  /  Alb  1.9<L>  /  TBili  0.1<L>  /  DBili  x   /  AST  8<L>  /  ALT  8<L>  /  AlkPhos  51  04-15    LIVER FUNCTIONS - ( 15 Apr 2022 06:43 )  Alb: 1.9 g/dL / Pro: 5.2 g/dL / ALK PHOS: 51 U/L / ALT: 8 U/L DA / AST: 8 U/L / GGT: x           PT/INR - ( 15 Apr 2022 10:20 )   PT: 12.2 sec;   INR: 1.02 ratio         PTT - ( 15 Apr 2022 10:20 )  PTT:41.0 sec  ____________________________________________    IMAGING:    ______________________________________________________________________  < from: CT Abdomen and Pelvis w/ Oral Cont and w/ IV Cont (04.13.22 @ 10:32) >    ACC: [id number] EXAM:  CT ABDOMEN AND PELVIS OC IC                          PROCEDURE DATE:  04/13/2022          INTERPRETATION:  CLINICAL INFORMATION: Abdominal pain since last night.   Rule out appendicitis.    COMPARISON: None.    CONTRAST/COMPLICATIONS:  IV Contrast: Omnipaque 350  90 cc administered   10 cc discarded  Oral Contrast: NONE  Complications: None reported at time of study completion    PROCEDURE:  CT of the Abdomen and Pelvis was performed.  Sagittal and coronal reformats were performed.    FINDINGS:    LOWER CHEST: No visualized pleural effusion    LIVER: Normal size. Main portal vein and hepatic veins are patent  BILE DUCTS: No biliary distention  GALLBLADDER: Unremarkable CT appearance  SPLEEN: Normal size  PANCREAS: No main ductal dilatation  ADRENALS: Unremarkable  KIDNEYS/URETERS: No hydronephrosis    BLADDER: Underdistended  REPRODUCTIVE ORGANS: Bilateral ovarian low attenuation lesions, measuring   3.3 cm on the right and left, with closed proximity the midline, image   107 series 2. Correlate with pelvic ultrasound or MRI for   characterization. Differential includes ovarian cysts, endometriomas, and   tuboovarian abscesses/PID.    BOWEL: Stomach and proximal small bowel mildly distended with oral   contrast.The mid small bowel is underdistended and unopacified with oral   contrast. Distal small bowel is distended, with long segment mural   thickening and hyperemia extending up to the terminal ileum.   Hyperenhancing bowel mucosa and surrounding vasa recta also noted along   the distal small bowel. Findings are concerning for distal small bowel   severe enteritis with associated low-grade/partial bowel obstruction   versus ileus. Infectious and inflammatory etiologies are the primary   considerations, including inflammatory bowel disease. The appendix is not   obstructed, nondistended. Large bowel is mostly fluid-filled and   underdistended. Tiny periampullary duodenal diverticulum.  PERITONEUM: Small ascites. No loculated fluid collection or free air.  VESSELS: No aneurysm of the abdominal aorta. Central vein patency is not   assessed due to timing of contrast.  RETROPERITONEUM/LYMPH NODES: Small volume nodes of the mesentery up to 7   mm in short axis.  ABDOMINAL WALL: Tiny fat-containing umbilical hernia.  BONES: No aggressive osseous lesion.    IMPRESSION:    Distal small bowel severe enteritis with upstream small bowel distention   associated low-grade/partial bowel obstruction versus ileus. Infectious   and inflammatory etiologies are the primary considerations, including   inflammatory bowel disease. Short term follow up imaging is recommended   to ensure resolution.    Bilateral ovarian low attenuation lesions, measuring 3.3 cm on the right   and left, with closed proximity the midline, image 107 series 2.   Correlate with pelvic ultrasound or MRI for characterization.   Differential includes ovarian cysts, endometriomas, and tuboovarian   abscesses/PID.    Appendix is not obstructed, non-distended.    Findings discussed with Dr. Carrillo from ER on 4/13/2022 at 11:30 AM.    --- End of Report ---      < end of copied text >

## 2022-04-15 NOTE — PROGRESS NOTE ADULT - ASSESSMENT
35 y/o F with PMH of CITLALLI and Hemorrhoids admitted for diarrhea workup.   AO x 3 reports no BM since admission, blood work reviewed and updated to pt and spouse at the bedside, QMA consulted for CITLALLI.

## 2022-04-15 NOTE — CONSULT NOTE ADULT - ASSESSMENT
This is a 36 year old F with PMH of CITLALLI, hemorrhoids and no PSH presenting for epigastric pain and diarrhea. Gi consult for anemia.

## 2022-04-15 NOTE — CONSULT NOTE ADULT - PROBLEM SELECTOR RECOMMENDATION 9
Patient noted to be anemic since 2017. She was on PO iron at home and self stopped due to GI issues. Noted to have down trending hemoglobin. Went from 8.7-->6.9. No overt sign of bleeding. Likely her anemia is due to nutritional deficit Vs menstruation Vs ideopathic. Less likely colon cancer, given no sign of bleeding, bowel habit changes or severe weight loss.   -trend CBC  -Transfuse if Hgb <7 or symptomatic   -hematology following- recommends IV iron  -Patient needs an EGD, colonoscopy with SBCE as an outpatient. She has a private GI doctor that she was referred to by her PCP.

## 2022-04-15 NOTE — DISCHARGE NOTE NURSING/CASE MANAGEMENT/SOCIAL WORK - PATIENT PORTAL LINK FT
You can access the FollowMyHealth Patient Portal offered by [facility] by registering at the following website: [email or web address]. By joining [employer]’s FollowMyHealth portal, you will also be able to view your health information using other applications (apps) compatible with our system.

## 2022-04-15 NOTE — PROGRESS NOTE ADULT - PROBLEM SELECTOR PLAN 2
see plan as above   seen by surgery, no indication for acute intervention  no further episodes of diarrhea, abdominal pain resolved  given thrombocytosis and patient h/[place] living in [place] in [place], will attempt to rule out parasitic infections causing diarrhea  Normal bowel movement  F/u GI PCR, O&P see plan as above   seen by surgery, no indication for acute intervention  no further episodes of diarrhea, abdominal pain resolved  given thrombocytosis and patient h/o living in [place] in [place], will attempt to rule out parasitic infections causing diarrhea  Normal bowel movement  Stool shows Blastocystis Hominis cysts see plan as above   seen by surgery, no indication for acute intervention  no further episodes of diarrhea, abdominal pain resolved  given thrombocytosis and patient h/o living in [place] in [place], will attempt to rule out parasitic infections causing diarrhea  Normal bowel movement  Stool shows Blastocystis Hominis cysts  GI PCR negative see plan as above   seen by surgery, no indication for acute intervention  nausea, abdominal pain resolved  given thrombocytosis and patient h/o living in village in [facility]  Normal bowel movement  Stool shows Blastocystis Hominis cysts  GI PCR negative

## 2022-04-15 NOTE — PROGRESS NOTE ADULT - PROBLEM SELECTOR PLAN 5
patient with incidental findings of ovarian cyst on CT  TVUS/ US pelvis with Complex cysts are identified within the bilateral ovarian parenchyma, as described above. Follow-up endovaginal pelvic ultrasonography in 2-3 months suggested for reevaluation. Free pelvic fluid.  urine gonorrhea/chlamydia negative   follow up TVUS as outpatient in 2-3 months

## 2022-04-15 NOTE — PROGRESS NOTE ADULT - PROBLEM SELECTOR PLAN 1
presented with diarrhea   no further BM reported since admission  Infectious(given travel history) vs. inflammatory bowel disease  tolerating regular diet well   CT abd = Distal small bowel severe enteritis with upstream small bowel distention associated low-grade/partial bowel obstruction versus ileus. Infectious and inflammatory etiologies are the primary considerations, including inflammatory bowel disease. Short term follow up imaging is recommended to ensure resolution.  GI Dr. Deshpande consulted = f/u outpatient presented with diarrhea   no further BM reported since admission  Infectious(given travel history) vs. inflammatory bowel disease  tolerating regular diet well   CT abd = Distal small bowel severe enteritis with upstream small bowel distention associated low-grade/partial bowel obstruction versus ileus. Infectious and inflammatory etiologies are the primary considerations, including inflammatory bowel disease. Short term follow up imaging is recommended to ensure resolution.  GI Dr. Deshpande consulted = f/u outpatient  ID Dr. Sterling consulted presented with diarrhea   no further BM reported since admission  Infectious(given travel history) vs. inflammatory bowel disease  tolerating regular diet well   CT abd = Distal small bowel severe enteritis with upstream small bowel distention associated low-grade/partial bowel obstruction versus ileus. Infectious and inflammatory etiologies are the primary considerations, including inflammatory bowel disease. Short term follow up imaging is recommended to ensure resolution.  GI Dr. Deshpande consulted = f/u outpatient. no need for treatment for Blastocystis Hominis as patient is asymptomatic.  ID Dr. Sterling consulted = no need for treatment for Blastocystis Hominis as patient is asymptomatic.

## 2022-04-15 NOTE — PROGRESS NOTE ADULT - SUBJECTIVE AND OBJECTIVE BOX
INTERVAL HPI/OVERNIGHT EVENTS:  Pt stable.   Tolerating diet.   flatus/BM.  Patient states abdominal pain subsided.    Vital Signs Last 24 Hrs  T(C): 36.8 (14 Apr 2022 08:48), Max: 36.8 (13 Apr 2022 11:08)  T(F): 98.2 (14 Apr 2022 08:48), Max: 98.2 (13 Apr 2022 11:08)  HR: 77 (14 Apr 2022 08:48) (68 - 82)  BP: 93/63 (14 Apr 2022 08:48) (88/51 - 96/71)  BP(mean): --  RR: 17 (14 Apr 2022 08:48) (16 - 18)  SpO2: 99% (14 Apr 2022 08:48) (99% - 100%)    Physical:  Abdomen: Soft nondistended, nontender.  No masses.    I&O's Summary      LABS:                        7.1    6.11  )-----------( 545      ( 14 Apr 2022 06:25 )             24.8             04-14    142  |  114<H>  |  3<L>  ----------------------------<  83  3.5   |  25  |  0.51    Ca    7.7<L>      14 Apr 2022 06:25    TPro  6.8  /  Alb  2.6<L>  /  TBili  0.3  /  DBili  x   /  AST  8<L>  /  ALT  10  /  AlkPhos  68  04-13  
NP Note discussed with  Primary Attending    Patient is a 36y old  Female who presents with a chief complaint of Abdominal pain (2022 09:44)      INTERVAL HPI/OVERNIGHT EVENTS: no new complaints    MEDICATIONS  (STANDING):  iron sucrose IVPB 200 milliGRAM(s) IV Intermittent every 24 hours  sodium chloride 0.9%. 1000 milliLiter(s) (100 mL/Hr) IV Continuous <Continuous>    MEDICATIONS  (PRN):  ondansetron Injectable 4 milliGRAM(s) IV Push every 8 hours PRN Nausea and/or Vomiting      __________________________________________________  REVIEW OF SYSTEMS:    CONSTITUTIONAL: No fever,   EYES: no acute visual disturbances  NECK: No pain or stiffness  RESPIRATORY: No cough; No shortness of breath  CARDIOVASCULAR: No chest pain, no palpitations  GASTROINTESTINAL: No pain. No nausea or vomiting; No diarrhea   NEUROLOGICAL: No headache or numbness, no tremors  MUSCULOSKELETAL: No joint pain, no muscle pain  GENITOURINARY: no dysuria, no frequency, no hesitancy  PSYCHIATRY: no depression , no anxiety  ALL OTHER  ROS negative        Vital Signs Last 24 Hrs  T(C): 36.8 (2022 08:48), Max: 36.8 (2022 08:48)  T(F): 98.2 (2022 08:48), Max: 98.2 (2022 08:48)  HR: 77 (2022 08:48) (68 - 82)  BP: 93/63 (2022 08:48) (92/57 - 96/71)  BP(mean): --  RR: 17 (2022 08:48) (16 - 18)  SpO2: 99% (2022 08:48) (99% - 100%)    ________________________________________________  PHYSICAL EXAM:  GENERAL: NAD  HEENT: Normocephalic;  conjunctivae and sclerae clear; moist mucous membranes;   NECK : supple  CHEST/LUNG: Clear to auscultation bilaterally with good air entry   HEART: S1 S2  regular; no murmurs, gallops or rubs  ABDOMEN: Soft, Nontender, Nondistended; Bowel sounds present  EXTREMITIES: no cyanosis; no edema; no calf tenderness  SKIN: warm and dry; no rash  NERVOUS SYSTEM:  Awake and alert; Oriented  to place, person and time ; no new deficits    _________________________________________________  LABS:                        7.1    6.11  )-----------( 545      ( 2022 06:25 )             24.8     04    142  |  114<H>  |  3<L>  ----------------------------<  83  3.5   |  25  |  0.51    Ca    7.7<L>      2022 06:25    TPro  6.8  /  Alb  2.6<L>  /  TBili  0.3  /  DBili  x   /  AST  8<L>  /  ALT  10  /  AlkPhos  68  -      Urinalysis Basic - ( 2022 13:24 )    Color: Yellow / Appearance: Clear / S.010 / pH: x  Gluc: x / Ketone: Trace  / Bili: Negative / Urobili: Negative   Blood: x / Protein: Negative / Nitrite: Negative   Leuk Esterase: Negative / RBC: x / WBC x   Sq Epi: x / Non Sq Epi: x / Bacteria: x      CAPILLARY BLOOD GLUCOSE            RADIOLOGY & ADDITIONAL TESTS:  < from: US Transvaginal (22 @ 12:40) >    ACC: [id number] EXAM:  US TRANSVAGINAL                        ACC: [id number] EXAM:  US PELVIC COMPLETE                          PROCEDURE DATE:  2022          INTERPRETATION:  CLINICAL INFORMATION: Bilateral low-attenuation ovarian   lesions.    LMP: 2022    COMPARISON: CT abdomen/pelvis 2022.    TECHNIQUE:  Endovaginal and transabdominal pelvic sonogram. Color and Spectral   Doppler was performed.    FINDINGS:    Uterus: 7.3 cm x 3.5 cm x 5.7 cm. Within normal limits.  Endometrium: 9 mm. Within normal limits.    Right ovary: 4.0 cm x 4.7 cm x 3.1 cm. 3.1 x 2.5 x 2.4 cm thick walled   cyst with septation and internal debris. Blood flow identified within the   right ovarian parenchyma.  Left ovary: 4.0 cm x 4.5 cm x 3.2 cm. 3.4x 2.7 x 2.7 cm thick-walled   cyst with septation and internal debris. Blood flow identified within the   left ovarian parenchyma.    Fluid: Free fluid is identified within the pelvic cul-de-sac and adnexal   regions.    IMPRESSION:  Complex cysts are identified within the bilateral ovarian parenchyma, as   described above. Follow-up endovaginal pelvic ultrasonography in 2-3   months suggested for reevaluation. Free pelvic fluid.        --- End of Report ---            LORENZO RANKIN MD; AttendingRadiologist  This document has been electronically signed. 2022  1:50PM    < end of copied text >  < from: CT Abdomen and Pelvis w/ Oral Cont and w/ IV Cont (22 @ 10:32) >    ACC: [id number] EXAM:  CT ABDOMEN AND PELVIS OC IC                          PROCEDURE DATE:  2022          INTERPRETATION:  CLINICAL INFORMATION: Abdominal pain since last night.   Rule out appendicitis.    COMPARISON: None.    CONTRAST/COMPLICATIONS:  IV Contrast: Omnipaque 350  90 cc administered   10 cc discarded  Oral Contrast: NONE  Complications: None reported at time of study completion    PROCEDURE:  CT of the Abdomen and Pelvis was performed.  Sagittal and coronal reformats were performed.    FINDINGS:    LOWER CHEST: No visualized pleural effusion    LIVER: Normal size. Main portal vein and hepatic veins are patent  BILE DUCTS: No biliary distention  GALLBLADDER: Unremarkable CT appearance  SPLEEN: Normal size  PANCREAS: No main ductal dilatation  ADRENALS: Unremarkable  KIDNEYS/URETERS: No hydronephrosis    BLADDER: Underdistended  REPRODUCTIVE ORGANS: Bilateral ovarian low attenuation lesions, measuring   3.3 cm on the right and left, with closed proximity the midline, image   107 series 2. Correlate with pelvic ultrasound or MRI for   characterization. Differential includes ovarian cysts, endometriomas, and   tuboovarian abscesses/PID.    BOWEL: Stomach and proximal small bowel mildly distended with oral   contrast.The mid small bowel is underdistended and unopacified with oral   contrast. Distal small bowel is distended, with long segment mural   thickening and hyperemia extending up to the terminal ileum.   Hyperenhancing bowel mucosa and surrounding vasa recta also noted along   the distal small bowel. Findings are concerning for distal small bowel   severe enteritis with associated low-grade/partial bowel obstruction   versus ileus. Infectious and inflammatory etiologies are the primary   considerations, including inflammatory bowel disease. The appendix is not   obstructed, nondistended. Large bowel is mostly fluid-filled and   underdistended. Tiny periampullary duodenal diverticulum.  PERITONEUM: Small ascites. No loculated fluid collection or free air.  VESSELS: No aneurysm of the abdominal aorta. Central vein patency is not   assessed due to timing of contrast.  RETROPERITONEUM/LYMPH NODES: Small volume nodes of the mesentery up to 7   mm in short axis.  ABDOMINAL WALL: Tiny fat-containing umbilical hernia.  BONES: No aggressive osseous lesion.    IMPRESSION:    Distal small bowel severe enteritis with upstream small bowel distention   associated low-grade/partial bowel obstruction versus ileus. Infectious   and inflammatory etiologies are the primary considerations, including   inflammatory bowel disease. Short term follow up imaging is recommended   to ensure resolution.    Bilateral ovarian low attenuation lesions, measuring 3.3 cm on the right   and left, with closed proximity the midline, image 107 series 2.   Correlate with pelvic ultrasound or MRI for characterization.   Differential includes ovarian cysts, endometriomas, and tuboovarian   abscesses/PID.    Appendix is not obstructed, non-distended.    Findings discussed with Dr. Carrillo from ER on 2022 at 11:30 AM.    --- End of Report ---            LUDWIG PRIDE M.D., ATTENDING RADIOLOGIST  This document has been electronically signed. 2022 11:31AM    < end of copied text >    Imaging Personally Reviewed:  YES    Consultant(s) Notes Reviewed:   YES    Care Discussed with Consultants : heme/onc     Plan of care was discussed with patient and /or primary care giver; all questions and concerns were addressed and care was aligned with patient's wishes.    
PGY-1 Progress Note discussed with attending    PAGER #: [[phone]] TILL 5:00 PM  PLEASE CONTACT ON CALL TEAM:  - On Call Team (Please refer to Jelena) FROM 5:00 PM - 8:30PM  - Nightfloat Team FROM 8:30 -7:30 AM    CHIEF COMPLAINT & BRIEF HOSPITAL COURSE:    INTERVAL HPI/OVERNIGHT EVENTS:   Patient seen and examined at bedside. No acute events overnight. Patient denies any complaints. She made normal bowel movement last night and sent stool sample.    REVIEW OF SYSTEMS:  CONSTITUTIONAL: No fever, weight loss, or fatigue  RESPIRATORY: No cough, wheezing, chills or hemoptysis; No shortness of breath  CARDIOVASCULAR: No chest pain, palpitations, dizziness, or leg swelling  GASTROINTESTINAL: No abdominal pain. No nausea, vomiting, or hematemesis; No diarrhea or constipation. No melena or hematochezia.  GENITOURINARY: No dysuria or hematuria, urinary frequency  NEUROLOGICAL: No headaches, memory loss, loss of strength, numbness, or tremors  SKIN: No itching, burning, rashes, or lesions     iron sucrose IVPB 200 milliGRAM(s) IV Intermittent every 24 hours  ondansetron Injectable 4 milliGRAM(s) IV Push every 8 hours PRN  sodium chloride 0.9%. 1000 milliLiter(s) IV Continuous <Continuous>      Vital Signs Last 24 Hrs  T(C): 36.7 (15 Apr 2022 09:38), Max: 36.8 (14 Apr 2022 20:56)  T(F): 98.1 (15 Apr 2022 09:38), Max: 98.3 (14 Apr 2022 20:56)  HR: 56 (15 Apr 2022 09:38) (56 - 78)  BP: 93/60 (15 Apr 2022 09:38) (89/51 - 106/58)  BP(mean): --  RR: 17 (15 Apr 2022 09:38) (17 - 18)  SpO2: 98% (15 Apr 2022 09:38) (96% - 100%)    PHYSICAL EXAMINATION:  GENERAL: NAD  HEAD:  Atraumatic, Normocephalic  EYES:  conjunctiva and sclera clear  NECK: Supple, No JVD, Normal thyroid  CHEST/LUNG: Clear to auscultation; No rales, rhonchi, wheezing, or rubs  HEART: Regular rate and rhythm; No murmurs, rubs, or gallops  ABDOMEN: Soft, Nontender, Nondistended; Bowel sounds present  NERVOUS SYSTEM:  Alert & Oriented X3,   EXTREMITIES:  2+ Peripheral Pulses, No clubbing, cyanosis, or edema  SKIN: warm dry                          6.9    7.99  )-----------( 521      ( 15 Apr 2022 06:44 )             24.1     04-15    143  |  113<H>  |  4<L>  ----------------------------<  85  3.6   |  24  |  0.45<L>    Ca    7.8<L>      15 Apr 2022 06:43  Phos  4.2     04-15  Mg     1.8     04-15    TPro  5.2<L>  /  Alb  1.9<L>  /  TBili  0.1<L>  /  DBili  x   /  AST  8<L>  /  ALT  8<L>  /  AlkPhos  51  04-15    LIVER FUNCTIONS - ( 15 Apr 2022 06:43 )  Alb: 1.9 g/dL / Pro: 5.2 g/dL / ALK PHOS: 51 U/L / ALT: 8 U/L DA / AST: 8 U/L / GGT: x               PT/INR - ( 15 Apr 2022 10:20 )   PT: 12.2 sec;   INR: 1.02 ratio         PTT - ( 15 Apr 2022 10:20 )  PTT:41.0 sec    CAPILLARY BLOOD GLUCOSE      RADIOLOGY & ADDITIONAL TESTS:

## 2022-04-18 LAB
HEMOGLOBIN INTERPRETATION: SIGNIFICANT CHANGE UP
HGB A MFR BLD: 98.1 % — HIGH (ref 95.8–98)
HGB A2 MFR BLD: 1.9 % — LOW (ref 2–3.2)

## 2023-09-27 NOTE — ED PROVIDER NOTE - ENMT NEGATIVE STATEMENT, MLM
Ears: no ear pain and no hearing problems. Nose: no nasal congestion and no nasal drainage. Mouth/Throat: no dysphagia, no hoarseness and no throat pain. Neck: no lumps, no pain, no stiffness and no swollen glands.
None known
risks and benefits of treatment options/instructions for management, treatment and follow up/risk factor reduction/importance of adherence to chosen treatment

## 2024-05-16 NOTE — ED PROVIDER NOTE - NS ED MD DISPO ADMITTING SERVICE

## 2024-10-27 ENCOUNTER — EMERGENCY (EMERGENCY)
Facility: HOSPITAL | Age: 39
LOS: 1 days | Discharge: HOME | End: 2024-10-27
Attending: EMERGENCY MEDICINE
Payer: COMMERCIAL

## 2024-10-27 VITALS
TEMPERATURE: 98 F | OXYGEN SATURATION: 100 % | SYSTOLIC BLOOD PRESSURE: 109 MMHG | RESPIRATION RATE: 18 BRPM | WEIGHT: 92.59 LBS | HEART RATE: 100 BPM | DIASTOLIC BLOOD PRESSURE: 77 MMHG | HEIGHT: 59 IN

## 2024-10-27 LAB
ALBUMIN SERPL ELPH-MCNC: 2.6 G/DL — LOW (ref 3.5–5)
ALP SERPL-CCNC: 118 U/L — SIGNIFICANT CHANGE UP (ref 40–120)
ALT FLD-CCNC: 34 U/L DA — SIGNIFICANT CHANGE UP (ref 10–60)
ANION GAP SERPL CALC-SCNC: 5 MMOL/L — SIGNIFICANT CHANGE UP (ref 5–17)
AST SERPL-CCNC: 30 U/L — SIGNIFICANT CHANGE UP (ref 10–40)
BASOPHILS # BLD AUTO: 0.02 K/UL — SIGNIFICANT CHANGE UP (ref 0–0.2)
BASOPHILS NFR BLD AUTO: 0.3 % — SIGNIFICANT CHANGE UP (ref 0–2)
BILIRUB SERPL-MCNC: 0.2 MG/DL — SIGNIFICANT CHANGE UP (ref 0.2–1.2)
BLD GP AB SCN SERPL QL: SIGNIFICANT CHANGE UP
BUN SERPL-MCNC: 10 MG/DL — SIGNIFICANT CHANGE UP (ref 7–18)
CALCIUM SERPL-MCNC: 8.7 MG/DL — SIGNIFICANT CHANGE UP (ref 8.4–10.5)
CHLORIDE SERPL-SCNC: 108 MMOL/L — SIGNIFICANT CHANGE UP (ref 96–108)
CK SERPL-CCNC: 90 U/L — SIGNIFICANT CHANGE UP (ref 21–215)
CO2 SERPL-SCNC: 26 MMOL/L — SIGNIFICANT CHANGE UP (ref 22–31)
CREAT SERPL-MCNC: 0.78 MG/DL — SIGNIFICANT CHANGE UP (ref 0.5–1.3)
EGFR: 99 ML/MIN/1.73M2 — SIGNIFICANT CHANGE UP
EOSINOPHIL # BLD AUTO: 0.01 K/UL — SIGNIFICANT CHANGE UP (ref 0–0.5)
EOSINOPHIL NFR BLD AUTO: 0.2 % — SIGNIFICANT CHANGE UP (ref 0–6)
GLUCOSE SERPL-MCNC: 102 MG/DL — HIGH (ref 70–99)
HCG SERPL-ACNC: <1 MIU/ML — SIGNIFICANT CHANGE UP
HCT VFR BLD CALC: 28.5 % — LOW (ref 34.5–45)
HGB BLD-MCNC: 9.2 G/DL — LOW (ref 11.5–15.5)
IMM GRANULOCYTES NFR BLD AUTO: 0.5 % — SIGNIFICANT CHANGE UP (ref 0–0.9)
LYMPHOCYTES # BLD AUTO: 1.03 K/UL — SIGNIFICANT CHANGE UP (ref 1–3.3)
LYMPHOCYTES # BLD AUTO: 15.9 % — SIGNIFICANT CHANGE UP (ref 13–44)
MAGNESIUM SERPL-MCNC: 2.1 MG/DL — SIGNIFICANT CHANGE UP (ref 1.6–2.6)
MCHC RBC-ENTMCNC: 23.2 PG — LOW (ref 27–34)
MCHC RBC-ENTMCNC: 32.3 GM/DL — SIGNIFICANT CHANGE UP (ref 32–36)
MCV RBC AUTO: 72 FL — LOW (ref 80–100)
MONOCYTES # BLD AUTO: 0.71 K/UL — SIGNIFICANT CHANGE UP (ref 0–0.9)
MONOCYTES NFR BLD AUTO: 11 % — SIGNIFICANT CHANGE UP (ref 2–14)
NEUTROPHILS # BLD AUTO: 4.68 K/UL — SIGNIFICANT CHANGE UP (ref 1.8–7.4)
NEUTROPHILS NFR BLD AUTO: 72.1 % — SIGNIFICANT CHANGE UP (ref 43–77)
NRBC # BLD: 0 /100 WBCS — SIGNIFICANT CHANGE UP (ref 0–0)
PLATELET # BLD AUTO: 294 K/UL — SIGNIFICANT CHANGE UP (ref 150–400)
POTASSIUM SERPL-MCNC: 3.6 MMOL/L — SIGNIFICANT CHANGE UP (ref 3.5–5.3)
POTASSIUM SERPL-SCNC: 3.6 MMOL/L — SIGNIFICANT CHANGE UP (ref 3.5–5.3)
PROT SERPL-MCNC: 6.4 G/DL — SIGNIFICANT CHANGE UP (ref 6–8.3)
RBC # BLD: 3.96 M/UL — SIGNIFICANT CHANGE UP (ref 3.8–5.2)
RBC # FLD: 15.6 % — HIGH (ref 10.3–14.5)
SODIUM SERPL-SCNC: 139 MMOL/L — SIGNIFICANT CHANGE UP (ref 135–145)
TROPONIN I, HIGH SENSITIVITY RESULT: 49.2 NG/L — SIGNIFICANT CHANGE UP
WBC # BLD: 6.48 K/UL — SIGNIFICANT CHANGE UP (ref 3.8–10.5)
WBC # FLD AUTO: 6.48 K/UL — SIGNIFICANT CHANGE UP (ref 3.8–10.5)

## 2024-10-27 PROCEDURE — 84484 ASSAY OF TROPONIN QUANT: CPT

## 2024-10-27 PROCEDURE — 99284 EMERGENCY DEPT VISIT MOD MDM: CPT | Mod: 25

## 2024-10-27 PROCEDURE — 80053 COMPREHEN METABOLIC PANEL: CPT

## 2024-10-27 PROCEDURE — 36415 COLL VENOUS BLD VENIPUNCTURE: CPT

## 2024-10-27 PROCEDURE — 83735 ASSAY OF MAGNESIUM: CPT

## 2024-10-27 PROCEDURE — 84702 CHORIONIC GONADOTROPIN TEST: CPT

## 2024-10-27 PROCEDURE — 86900 BLOOD TYPING SEROLOGIC ABO: CPT

## 2024-10-27 PROCEDURE — 82550 ASSAY OF CK (CPK): CPT

## 2024-10-27 PROCEDURE — 99285 EMERGENCY DEPT VISIT HI MDM: CPT

## 2024-10-27 PROCEDURE — 85025 COMPLETE CBC W/AUTO DIFF WBC: CPT

## 2024-10-27 PROCEDURE — 86850 RBC ANTIBODY SCREEN: CPT

## 2024-10-27 PROCEDURE — 96374 THER/PROPH/DIAG INJ IV PUSH: CPT

## 2024-10-27 PROCEDURE — 93005 ELECTROCARDIOGRAM TRACING: CPT

## 2024-10-27 PROCEDURE — 86901 BLOOD TYPING SEROLOGIC RH(D): CPT

## 2024-10-27 RX ORDER — SODIUM CHLORIDE 0.9 % (FLUSH) 0.9 %
1000 SYRINGE (ML) INJECTION
Refills: 0 | Status: ACTIVE | OUTPATIENT
Start: 2024-10-27 | End: 2025-09-25

## 2024-10-27 RX ORDER — MECLIZINE HYDROCLORIDE 25 MG/1
12.5 TABLET ORAL ONCE
Refills: 0 | Status: COMPLETED | OUTPATIENT
Start: 2024-10-27 | End: 2024-10-27

## 2024-10-27 RX ORDER — METOCLOPRAMIDE HCL 5 MG
10 TABLET ORAL ONCE
Refills: 0 | Status: COMPLETED | OUTPATIENT
Start: 2024-10-27 | End: 2024-10-27

## 2024-10-27 RX ADMIN — Medication 104 MILLIGRAM(S): at 19:01

## 2024-10-27 RX ADMIN — MECLIZINE HYDROCLORIDE 12.5 MILLIGRAM(S): 25 TABLET ORAL at 19:01

## 2024-10-27 RX ADMIN — Medication 150 MILLILITER(S): at 17:23

## 2024-10-27 NOTE — ED PROVIDER NOTE - NEUROLOGICAL, MLM
Alert and oriented, no focal deficits, no motor or sensory deficits. no nuchal rigidity Alert and oriented, no focal deficits, no motor or sensory deficits. no nuchal rigidity, unable to get up and walk

## 2024-10-27 NOTE — ED PROVIDER NOTE - OBJECTIVE STATEMENT
39-year-old female with history of anemia, blood transfusion 2 years ago, LMP 10/20.  As per , patient with chills, fever, dizziness, myalgia since Thursday patient denies runny nose, coughing, sick contact, recent traveling, dysuria.  She vomited x 1 on Friday and Saturday, vertigo, worse with head movement, ataxia.  Patient denies recent cold, ear infection, headache

## 2024-10-27 NOTE — ED PROVIDER NOTE - MUSCULOSKELETAL, MLM
The patient was notified of the Dexa results of osteopenia. Spine appears normal, range of motion is not limited, no muscle or joint tenderness, no CVAT

## 2024-10-27 NOTE — ED ADULT NURSE REASSESSMENT NOTE - NS ED NURSE REASSESS COMMENT FT1
Hand off :  received in stable condition NAD. Patient is A&OX4 speaking in full comprehensible sentences unlabored breathing. Pt denies any discomfort at this moment. Patent 20G IV access  RT FOREARM present, dressing is clean and dry. Continuum of care on going

## 2024-10-27 NOTE — ED ADULT NURSE NOTE - NSFALLRISKINTERV_ED_ALL_ED

## 2024-10-27 NOTE — ED PROVIDER NOTE - CLINICAL SUMMARY MEDICAL DECISION MAKING FREE TEXT BOX
39-year-old female with history of anemia, complaining of fever, chills dizziness, myalgia since Thursday.  Patient's dizziness worse past few days, concern for viral infection, BPV, will get labs, give meds, IV fluids and reassess

## 2024-10-27 NOTE — ED PROVIDER NOTE - NSFOLLOWUPINSTRUCTIONS_ED_ALL_ED_FT
no
Vertigo  The outer and inner ear showing the eardrum and ear canal.  Vertigo is the feeling that you or the things around you are moving or spinning when they're not. It's different than feeling dizzy. It can also cause:  Loss of balance.  Trouble standing or walking.  Nausea and vomiting.  This feeling can come and go at any time. It can last from a few seconds to minutes or even hours. It may go away on its own or be treated with medicine.    What are the types of vertigo?  There are two types of vertigo:  Peripheral vertigo happens when parts of your inner ear don't work like they should. This is the more common type.  Central vertigo happens when your brain and spinal cord don't work like they should.  Your health care provider will do tests to find out what kind of vertigo you have. This will help them decide on the right treatment for you.    Follow these instructions at home:  Eating and drinking    Drink enough fluid to keep your pee (urine) pale yellow.  Do not drink alcohol.  Activity    When you get up in the morning, first sit up on the side of the bed. When you feel okay, stand slowly while holding onto something.  Move slowly. Avoid sudden body or head movements.  Avoid certain positions, as told by your provider.  Use a cane if you have trouble standing or walking.  Sit down right away if you feel unsteady.  Place items in your home so they're easy for you to reach without bending or leaning over.  Return to normal activities when you're told. Ask what things are safe for you to do.  General instructions    Take your medicines only as told by your provider.  Contact a health care provider if:  Your medicines don't help or make your vertigo worse.  You get new symptoms.  You have a fever.  You have nausea or vomiting.  Your family or friends spot any changes in how you're acting.  A part of your body goes numb.  You feel tingling and prickling in a part of your body.  You get very bad headaches.  Get help right away if:  You're always dizzy or you faint.  You have a stiff neck.  You have trouble moving or speaking.  Your hands, arms, or legs feel weak.  Your hearing or eyesight changes.  These symptoms may be an emergency. Call 911 right away.  Do not wait to see if the symptoms will go away.  Do not drive yourself to the hospital.  This information is not intended to replace advice given to you by your health care provider. Make sure you discuss any questions you have with your health care provider.      Drink plenty of fluids   place Zofran under your tongue for your nauseousness   3 to 4 minutes later take meclizine for your dizziness, vertigo   if dizziness vertigo persists, you will need to follow-up with either ear nose and throat specialist or neurologist   follow-up with your medical doctor and GYN

## 2024-10-27 NOTE — ED PROVIDER NOTE - PATIENT PORTAL LINK FT
You can access the FollowMyHealth Patient Portal offered by [facility] by registering at the following website: [email or web address]. By joining [facility]’s FollowMyHealth portal, you will also be able to view your health information using other applications (apps) compatible with our system.

## 2024-10-27 NOTE — ED PROVIDER NOTE - PROGRESS NOTE DETAILS
repeat troponins much better,   patient is feeling much better, got up able to ambulate with no ataxia, will discharge home   patient claims her blood pressure is always on the lower side, she only weighs 42 kg labs explained to patient and    hemoglobin 9.2, does not require blood transfusion   patient's troponin slightly elevated, will repeat troponin again   patient complaining of feeling dizzy, will give Antivert and observe

## 2024-10-27 NOTE — ED ADULT NURSE NOTE - OBJECTIVE STATEMENT
Pt c/o dizziness, fever, chills, nausea, vomiting, and one episode of diarrhea since Thursday 10/24/24. Denies any chest pain or SOB, reports  hx of anemia.

## 2024-10-28 VITALS
DIASTOLIC BLOOD PRESSURE: 64 MMHG | TEMPERATURE: 98 F | SYSTOLIC BLOOD PRESSURE: 96 MMHG | RESPIRATION RATE: 16 BRPM | HEART RATE: 84 BPM | OXYGEN SATURATION: 100 %

## 2024-10-28 PROBLEM — D64.9 ANEMIA, UNSPECIFIED: Chronic | Status: ACTIVE | Noted: 2022-04-13

## 2024-10-28 LAB — TROPONIN I, HIGH SENSITIVITY RESULT: 28.4 NG/L — SIGNIFICANT CHANGE UP

## 2024-10-28 RX ORDER — MECLIZINE HYDROCLORIDE 25 MG/1
1 TABLET ORAL
Qty: 15 | Refills: 0
Start: 2024-10-28 | End: 2024-11-01

## 2024-10-28 RX ORDER — ONDANSETRON HCL/PF 4 MG/2 ML
1 VIAL (ML) INJECTION
Qty: 15 | Refills: 0
Start: 2024-10-28 | End: 2024-11-01

## 2025-05-09 NOTE — PATIENT PROFILE ADULT - NSPRESCRALCFREQ_GEN_A_NUR
Subjective   Chief Complaint   Patient presents with    Anxiety   Mode of Visit: Video  Location of patient: home  Location of provider: [facility]  You have chosen to receive care through a telehealth visit.  Does the patient consent to use a video/audio connection for your medical care today? Yes  The visit included audio and video interaction. No technical issues occurred during this visit.       History of Present Illness     Feels like she has been a little more irritable or on edge. The increased sweating she thinks resolved after decreasing the Pristiq.      Patient Active Problem List   Diagnosis    Migraine with aura and without status migrainosus, not intractable    Anxiety disorder, unspecified    Dysmenorrhea, unspecified    Acne    Scoliosis    Perioral dermatitis    Abdominal bloating       No Known Allergies    Current Outpatient Medications on File Prior to Visit   Medication Sig Dispense Refill    amitriptyline (ELAVIL) 50 MG tablet Take 1 tablet by mouth every night at bedtime. 90 tablet 1    busPIRone (BUSPAR) 10 MG tablet TAKE 2 TABLETS BY MOUTH 3 TIMES A DAY. 540 tablet 1    desvenlafaxine (Pristiq) 50 MG 24 hr tablet Take 1 tablet by mouth Daily. 90 tablet 0    hydrOXYzine (ATARAX) 25 MG tablet Take 1 tablet by mouth 3 (Three) Times a Day As Needed for Anxiety. 270 tablet 1    Peppermint Oil (IBGARD PO) Take  by mouth.      polycarbophil (calcium polycarbophil) 625 MG tablet tablet Take  by mouth Daily.      propranolol (INDERAL) 80 MG tablet Take 1 tablet by mouth 3 (Three) Times a Day As Needed (anxiety). 90 tablet 1    spironolactone (ALDACTONE) 50 MG tablet Take 1 tablet by mouth 2 (Two) Times a Day.      clobetasol (TEMOVATE) 0.05 % external solution Apply  topically to the appropriate area as directed 2 (Two) Times a Day. 50 mL 3    ketoconazole (NIZORAL) 2 % shampoo APPLY TOPICALLY TO THE APPROPRIATE AREA AS DIRECTED 2 (TWO) TIMES A WEEK. 120 mL 3    rizatriptan MLT (MAXALT-MLT) 10 MG  disintegrating tablet Place 1 tablet on the tongue 1 (One) Time As Needed for Migraine. May repeat in 2 hours if needed 9 tablet 2    [DISCONTINUED] buPROPion SR (Wellbutrin SR) 100 MG 12 hr tablet Take 1 tablet by mouth 2 (Two) Times a Day. 180 tablet 0     No current facility-administered medications on file prior to visit.       Past Medical History:   Diagnosis Date    Anxiety disorder, unspecified     Atopic dermatitis, unspecified     Depression 2010    Dysmenorrhea, unspecified     Epigastric pain     History of MRI 01/01/2014    BRAIN NORMAL (DONE FOR COMPLICATED MIGRAINES)    Menstrual migraine, not intractable, without status migrainosus     Migraine with aura, not intractable, without status migrainosus     Pain in right finger(s)     Perioral dermatitis 01/14/2021       Family History   Problem Relation Age of Onset    Stroke Mother         TIA    Depression Father     Alcohol abuse Father     Cancer Father         Retinal    Cancer Paternal Grandfather         melanoma    Stroke Maternal Grandmother     Arthritis Maternal Grandmother     Hyperlipidemia Maternal Grandmother     Thyroid disease Maternal Grandmother        Social History     Socioeconomic History    Marital status: Single   Tobacco Use    Smoking status: Never    Smokeless tobacco: Never   Vaping Use    Vaping status: Never Used   Substance and Sexual Activity    Alcohol use: Not Currently     Alcohol/week: 2.0 standard drinks of alcohol    Drug use: Never    Sexual activity: Yes     Partners: Male     Birth control/protection: Condom       Past Surgical History:   Procedure Laterality Date    ADENOIDECTOMY  2000    TONSILLECTOMY  2000     The following portions of the patient's history were reviewed and updated as appropriate: problem list, allergies, current medications, past medical history, past family history, past social history, and past surgical history.    ROS    See HPI    Immunization History   Administered Date(s) Administered     COVID-19 (MODERNA) 1st,2nd,3rd Dose Monovalent 01/28/2021, 02/25/2021, 11/27/2021    DTaP 02/08/1999, 04/08/1999, 06/03/1999, 03/01/2000, 05/09/2003    Flu Vaccine Split Quad 11/06/2017    Fluzone (or Fluarix & Flulaval for VFC) >6mos 11/06/2017    Hepatitis A 05/11/2018, 07/12/2019    Hepatitis B Adult/Adolescent IM 02/08/1999, 06/03/1999, 12/06/1999    HiB 04/08/1999, 03/01/2000    IPV 02/08/1999, 09/03/1999, 12/06/1999, 03/01/2000    Influenza, Unspecified 11/26/2019, 11/25/2021, 09/05/2022    MMR 12/06/1999, 05/09/2003    Meningococcal Conjugate 07/22/2010    PEDS-Pneumococcal Conjugate (PCV7) 07/10/2000, 12/05/2000    Tdap 07/22/2010, 07/22/2010, 07/15/2021    Varicella 07/10/2000, 07/22/2010       Objective   There were no vitals filed for this visit.  There is no height or weight on file to calculate BMI.  Physical Exam  Constitutional:       Appearance: Normal appearance.   HENT:      Head: Normocephalic and atraumatic.   Neurological:      Mental Status: She is alert.   Psychiatric:         Mood and Affect: Mood normal.         Behavior: Behavior normal.         Thought Content: Thought content normal.         Judgment: Judgment normal.       Assessment & Plan   Diagnoses and all orders for this visit:    1. Generalized anxiety disorder (Primary)  -     Desvenlafaxine Succinate ER (Pristiq) 25 MG tablet sustained-release 24 hour; Take 1 tablet by mouth Daily.  Dispense: 90 tablet; Refill: 0     Will have her stop the Wellbutrin. Increase the Pristiq back to 75 mg daily. Will give her Dr. Longoria's information.                 Never